# Patient Record
Sex: FEMALE | Race: WHITE | URBAN - METROPOLITAN AREA
[De-identification: names, ages, dates, MRNs, and addresses within clinical notes are randomized per-mention and may not be internally consistent; named-entity substitution may affect disease eponyms.]

---

## 2017-03-03 ENCOUNTER — OP HISTORICAL/CONVERTED ENCOUNTER (OUTPATIENT)
Dept: OTHER | Age: 22
End: 2017-03-03

## 2017-04-01 ENCOUNTER — ED HISTORICAL/CONVERTED ENCOUNTER (OUTPATIENT)
Dept: OTHER | Age: 22
End: 2017-04-01

## 2017-04-07 ENCOUNTER — OP HISTORICAL/CONVERTED ENCOUNTER (OUTPATIENT)
Dept: OTHER | Age: 22
End: 2017-04-07

## 2017-04-26 ENCOUNTER — OP HISTORICAL/CONVERTED ENCOUNTER (OUTPATIENT)
Dept: OTHER | Age: 22
End: 2017-04-26

## 2017-05-02 ENCOUNTER — OP HISTORICAL/CONVERTED ENCOUNTER (OUTPATIENT)
Dept: OTHER | Age: 22
End: 2017-05-02

## 2017-06-12 ENCOUNTER — OP HISTORICAL/CONVERTED ENCOUNTER (OUTPATIENT)
Dept: OTHER | Age: 22
End: 2017-06-12

## 2017-06-29 ENCOUNTER — EMERGENCY (EMERGENCY)
Facility: HOSPITAL | Age: 22
LOS: 1 days | Discharge: HOME | End: 2017-06-29
Attending: EMERGENCY MEDICINE | Admitting: EMERGENCY MEDICINE
Payer: COMMERCIAL

## 2017-06-29 VITALS
TEMPERATURE: 99 F | OXYGEN SATURATION: 100 % | RESPIRATION RATE: 16 BRPM | DIASTOLIC BLOOD PRESSURE: 67 MMHG | HEART RATE: 86 BPM | SYSTOLIC BLOOD PRESSURE: 104 MMHG

## 2017-06-29 DIAGNOSIS — F41.9 ANXIETY DISORDER, UNSPECIFIED: ICD-10-CM

## 2017-06-29 DIAGNOSIS — O99.89 OTHER SPECIFIED DISEASES AND CONDITIONS COMPLICATING PREGNANCY, CHILDBIRTH AND THE PUERPERIUM: ICD-10-CM

## 2017-06-29 DIAGNOSIS — Z79.899 OTHER LONG TERM (CURRENT) DRUG THERAPY: ICD-10-CM

## 2017-06-29 DIAGNOSIS — R20.2 PARESTHESIA OF SKIN: ICD-10-CM

## 2017-06-29 DIAGNOSIS — Z3A.00 WEEKS OF GESTATION OF PREGNANCY NOT SPECIFIED: ICD-10-CM

## 2017-06-29 DIAGNOSIS — O99.340 OTHER MENTAL DISORDERS COMPLICATING PREGNANCY, UNSPECIFIED TRIMESTER: ICD-10-CM

## 2017-06-29 LAB
ALBUMIN SERPL ELPH-MCNC: 4.8 G/DL — SIGNIFICANT CHANGE UP (ref 3.3–5)
ALP SERPL-CCNC: 55 U/L — SIGNIFICANT CHANGE UP (ref 40–120)
ALT FLD-CCNC: 9 U/L — LOW (ref 10–45)
ANION GAP SERPL CALC-SCNC: 13 MMOL/L — SIGNIFICANT CHANGE UP (ref 5–17)
APTT BLD: 33 SEC — SIGNIFICANT CHANGE UP (ref 27.5–37.4)
AST SERPL-CCNC: 14 U/L — SIGNIFICANT CHANGE UP (ref 10–40)
BASOPHILS NFR BLD AUTO: 0.6 % — SIGNIFICANT CHANGE UP (ref 0–2)
BILIRUB SERPL-MCNC: 0.2 MG/DL — SIGNIFICANT CHANGE UP (ref 0.2–1.2)
BUN SERPL-MCNC: 10 MG/DL — SIGNIFICANT CHANGE UP (ref 7–23)
CALCIUM SERPL-MCNC: 9.6 MG/DL — SIGNIFICANT CHANGE UP (ref 8.4–10.5)
CHLORIDE SERPL-SCNC: 99 MMOL/L — SIGNIFICANT CHANGE UP (ref 96–108)
CO2 SERPL-SCNC: 26 MMOL/L — SIGNIFICANT CHANGE UP (ref 22–31)
CREAT SERPL-MCNC: 0.6 MG/DL — SIGNIFICANT CHANGE UP (ref 0.5–1.3)
D DIMER BLD IA.RAPID-MCNC: <150 NG/ML DDU — SIGNIFICANT CHANGE UP
EOSINOPHIL NFR BLD AUTO: 0.6 % — SIGNIFICANT CHANGE UP (ref 0–6)
GLUCOSE SERPL-MCNC: 93 MG/DL — SIGNIFICANT CHANGE UP (ref 70–99)
HCG SERPL-ACNC: 320.4 MIU/ML — HIGH
HCT VFR BLD CALC: 39 % — SIGNIFICANT CHANGE UP (ref 34.5–45)
HGB BLD-MCNC: 13.1 G/DL — SIGNIFICANT CHANGE UP (ref 11.5–15.5)
INR BLD: 1.14 — SIGNIFICANT CHANGE UP (ref 0.88–1.16)
LYMPHOCYTES # BLD AUTO: 19.6 % — SIGNIFICANT CHANGE UP (ref 13–44)
MCHC RBC-ENTMCNC: 31 PG — SIGNIFICANT CHANGE UP (ref 27–34)
MCHC RBC-ENTMCNC: 33.6 G/DL — SIGNIFICANT CHANGE UP (ref 32–36)
MCV RBC AUTO: 92.2 FL — SIGNIFICANT CHANGE UP (ref 80–100)
MONOCYTES NFR BLD AUTO: 6.1 % — SIGNIFICANT CHANGE UP (ref 2–14)
NEUTROPHILS NFR BLD AUTO: 73.1 % — SIGNIFICANT CHANGE UP (ref 43–77)
PLATELET # BLD AUTO: 194 K/UL — SIGNIFICANT CHANGE UP (ref 150–400)
POTASSIUM SERPL-MCNC: 3.6 MMOL/L — SIGNIFICANT CHANGE UP (ref 3.5–5.3)
POTASSIUM SERPL-SCNC: 3.6 MMOL/L — SIGNIFICANT CHANGE UP (ref 3.5–5.3)
PROT SERPL-MCNC: 7.9 G/DL — SIGNIFICANT CHANGE UP (ref 6–8.3)
PROTHROM AB SERPL-ACNC: 12.7 SEC — SIGNIFICANT CHANGE UP (ref 9.8–12.7)
RBC # BLD: 4.23 M/UL — SIGNIFICANT CHANGE UP (ref 3.8–5.2)
RBC # FLD: 13.1 % — SIGNIFICANT CHANGE UP (ref 10.3–16.9)
SODIUM SERPL-SCNC: 138 MMOL/L — SIGNIFICANT CHANGE UP (ref 135–145)
WBC # BLD: 6.6 K/UL — SIGNIFICANT CHANGE UP (ref 3.8–10.5)
WBC # FLD AUTO: 6.6 K/UL — SIGNIFICANT CHANGE UP (ref 3.8–10.5)

## 2017-06-29 PROCEDURE — 80053 COMPREHEN METABOLIC PANEL: CPT

## 2017-06-29 PROCEDURE — 85379 FIBRIN DEGRADATION QUANT: CPT

## 2017-06-29 PROCEDURE — 85025 COMPLETE CBC W/AUTO DIFF WBC: CPT

## 2017-06-29 PROCEDURE — 71046 X-RAY EXAM CHEST 2 VIEWS: CPT

## 2017-06-29 PROCEDURE — 85730 THROMBOPLASTIN TIME PARTIAL: CPT

## 2017-06-29 PROCEDURE — 71020: CPT | Mod: 26

## 2017-06-29 PROCEDURE — 85610 PROTHROMBIN TIME: CPT

## 2017-06-29 PROCEDURE — 93010 ELECTROCARDIOGRAM REPORT: CPT | Mod: NC

## 2017-06-29 PROCEDURE — 84702 CHORIONIC GONADOTROPIN TEST: CPT

## 2017-06-29 PROCEDURE — 99284 EMERGENCY DEPT VISIT MOD MDM: CPT | Mod: 25

## 2017-06-29 PROCEDURE — 36415 COLL VENOUS BLD VENIPUNCTURE: CPT

## 2017-06-29 PROCEDURE — 99285 EMERGENCY DEPT VISIT HI MDM: CPT | Mod: 25

## 2017-06-29 PROCEDURE — 93005 ELECTROCARDIOGRAM TRACING: CPT

## 2017-06-29 RX ORDER — ACETAMINOPHEN 500 MG
650 TABLET ORAL ONCE
Refills: 0 | Status: COMPLETED | OUTPATIENT
Start: 2017-06-29 | End: 2017-06-29

## 2017-06-29 RX ADMIN — Medication 650 MILLIGRAM(S): at 13:38

## 2017-06-29 RX ADMIN — Medication 650 MILLIGRAM(S): at 14:30

## 2017-06-29 NOTE — ED PROVIDER NOTE - MEDICAL DECISION MAKING DETAILS
21 yo female with h/o anxiety, bipolar d/o, in the ER to check for pregnancy and also c/o chest pain that started when she found out that her UCG is positive. Pt appears in no distress, VSS. CXR- normal and negative d-dimer, BHCG-320. Pt has no pelvic pain, no vag. discharge or bleeding. pelvic exam- unremarkable, results discussed with pt. f/u with GYN recommended for further prenatal care. Pt seen by , as she mentioned that right now she is homeless.

## 2017-06-29 NOTE — ED ADULT TRIAGE NOTE - CHIEF COMPLAINT QUOTE
patient reports chest pain since last night, reports pain is worse with inspiration. Patient reports "I can't even take a deep breath." Patient also requesting pregnancy test, reports "I took 4 pregnancy tests at home and they were positive but I want to take another one." Reports LMP 06/02/17

## 2017-06-29 NOTE — ED PROVIDER NOTE - OBJECTIVE STATEMENT
23 yo female in the Er with left side chest pain, tightness since this morning. Pt reports that pain worse when she takes a deep breath. Pt also mentioned that she had changes in appetite and cravings recently, felt that her breast enlarged and she did home UCG last night and it was+. LMP  06/02/17, has h/o PID, irregular bleeding, and Chlamydia+ recently.

## 2017-07-01 ENCOUNTER — OP HISTORICAL/CONVERTED ENCOUNTER (OUTPATIENT)
Dept: OTHER | Age: 22
End: 2017-07-01

## 2017-07-27 ENCOUNTER — EMERGENCY (EMERGENCY)
Facility: HOSPITAL | Age: 22
LOS: 1 days | Discharge: HOME | End: 2017-07-27
Attending: EMERGENCY MEDICINE | Admitting: EMERGENCY MEDICINE
Payer: COMMERCIAL

## 2017-07-27 VITALS
DIASTOLIC BLOOD PRESSURE: 72 MMHG | TEMPERATURE: 98 F | SYSTOLIC BLOOD PRESSURE: 108 MMHG | RESPIRATION RATE: 18 BRPM | HEART RATE: 78 BPM | OXYGEN SATURATION: 100 %

## 2017-07-27 VITALS
RESPIRATION RATE: 18 BRPM | DIASTOLIC BLOOD PRESSURE: 76 MMHG | SYSTOLIC BLOOD PRESSURE: 114 MMHG | HEIGHT: 61 IN | OXYGEN SATURATION: 100 % | TEMPERATURE: 98 F | HEART RATE: 100 BPM | WEIGHT: 90.39 LBS

## 2017-07-27 DIAGNOSIS — O21.0 MILD HYPEREMESIS GRAVIDARUM: ICD-10-CM

## 2017-07-27 DIAGNOSIS — R11.0 NAUSEA: ICD-10-CM

## 2017-07-27 DIAGNOSIS — R19.7 DIARRHEA, UNSPECIFIED: ICD-10-CM

## 2017-07-27 DIAGNOSIS — Z79.899 OTHER LONG TERM (CURRENT) DRUG THERAPY: ICD-10-CM

## 2017-07-27 DIAGNOSIS — Z3A.08 8 WEEKS GESTATION OF PREGNANCY: ICD-10-CM

## 2017-07-27 DIAGNOSIS — G43.909 MIGRAINE, UNSPECIFIED, NOT INTRACTABLE, WITHOUT STATUS MIGRAINOSUS: ICD-10-CM

## 2017-07-27 LAB
ALBUMIN SERPL ELPH-MCNC: 4.4 G/DL — SIGNIFICANT CHANGE UP (ref 3.3–5)
ALP SERPL-CCNC: 47 U/L — SIGNIFICANT CHANGE UP (ref 40–120)
ALT FLD-CCNC: 12 U/L — SIGNIFICANT CHANGE UP (ref 10–45)
ANION GAP SERPL CALC-SCNC: 14 MMOL/L — SIGNIFICANT CHANGE UP (ref 5–17)
APPEARANCE UR: CLEAR — SIGNIFICANT CHANGE UP
AST SERPL-CCNC: 18 U/L — SIGNIFICANT CHANGE UP (ref 10–40)
B-OH-BUTYR SERPL-SCNC: 0.1 MMOL/L — SIGNIFICANT CHANGE UP
BASOPHILS NFR BLD AUTO: 0.4 % — SIGNIFICANT CHANGE UP (ref 0–2)
BILIRUB SERPL-MCNC: 0.3 MG/DL — SIGNIFICANT CHANGE UP (ref 0.2–1.2)
BILIRUB UR-MCNC: NEGATIVE — SIGNIFICANT CHANGE UP
BUN SERPL-MCNC: 11 MG/DL — SIGNIFICANT CHANGE UP (ref 7–23)
CALCIUM SERPL-MCNC: 9.8 MG/DL — SIGNIFICANT CHANGE UP (ref 8.4–10.5)
CHLORIDE SERPL-SCNC: 97 MMOL/L — SIGNIFICANT CHANGE UP (ref 96–108)
CO2 SERPL-SCNC: 21 MMOL/L — LOW (ref 22–31)
COLOR SPEC: YELLOW — SIGNIFICANT CHANGE UP
CREAT SERPL-MCNC: 0.5 MG/DL — SIGNIFICANT CHANGE UP (ref 0.5–1.3)
DIFF PNL FLD: NEGATIVE — SIGNIFICANT CHANGE UP
EOSINOPHIL NFR BLD AUTO: 0.3 % — SIGNIFICANT CHANGE UP (ref 0–6)
GLUCOSE SERPL-MCNC: 143 MG/DL — HIGH (ref 70–99)
GLUCOSE UR QL: NEGATIVE — SIGNIFICANT CHANGE UP
HCG SERPL-ACNC: HIGH MIU/ML
HCT VFR BLD CALC: 33 % — LOW (ref 34.5–45)
HGB BLD-MCNC: 11.3 G/DL — LOW (ref 11.5–15.5)
KETONES UR-MCNC: NEGATIVE — SIGNIFICANT CHANGE UP
LEUKOCYTE ESTERASE UR-ACNC: NEGATIVE — SIGNIFICANT CHANGE UP
LIDOCAIN IGE QN: 19 U/L — SIGNIFICANT CHANGE UP (ref 7–60)
LYMPHOCYTES # BLD AUTO: 13.5 % — SIGNIFICANT CHANGE UP (ref 13–44)
MCHC RBC-ENTMCNC: 31 PG — SIGNIFICANT CHANGE UP (ref 27–34)
MCHC RBC-ENTMCNC: 34.2 G/DL — SIGNIFICANT CHANGE UP (ref 32–36)
MCV RBC AUTO: 90.7 FL — SIGNIFICANT CHANGE UP (ref 80–100)
MONOCYTES NFR BLD AUTO: 5.6 % — SIGNIFICANT CHANGE UP (ref 2–14)
NEUTROPHILS NFR BLD AUTO: 80.2 % — HIGH (ref 43–77)
NITRITE UR-MCNC: NEGATIVE — SIGNIFICANT CHANGE UP
PH UR: 5 — SIGNIFICANT CHANGE UP (ref 5–8)
PLATELET # BLD AUTO: 226 K/UL — SIGNIFICANT CHANGE UP (ref 150–400)
POTASSIUM SERPL-MCNC: 3.4 MMOL/L — LOW (ref 3.5–5.3)
POTASSIUM SERPL-SCNC: 3.4 MMOL/L — LOW (ref 3.5–5.3)
PROT SERPL-MCNC: 7.4 G/DL — SIGNIFICANT CHANGE UP (ref 6–8.3)
PROT UR-MCNC: (no result) MG/DL
RBC # BLD: 3.64 M/UL — LOW (ref 3.8–5.2)
RBC # FLD: 13.7 % — SIGNIFICANT CHANGE UP (ref 10.3–16.9)
SODIUM SERPL-SCNC: 132 MMOL/L — LOW (ref 135–145)
SP GR SPEC: >=1.03 — SIGNIFICANT CHANGE UP (ref 1–1.03)
UROBILINOGEN FLD QL: 0.2 E.U./DL — SIGNIFICANT CHANGE UP
WBC # BLD: 8.9 K/UL — SIGNIFICANT CHANGE UP (ref 3.8–10.5)
WBC # FLD AUTO: 8.9 K/UL — SIGNIFICANT CHANGE UP (ref 3.8–10.5)

## 2017-07-27 PROCEDURE — 36415 COLL VENOUS BLD VENIPUNCTURE: CPT

## 2017-07-27 PROCEDURE — 81001 URINALYSIS AUTO W/SCOPE: CPT

## 2017-07-27 PROCEDURE — 76801 OB US < 14 WKS SINGLE FETUS: CPT

## 2017-07-27 PROCEDURE — 96374 THER/PROPH/DIAG INJ IV PUSH: CPT

## 2017-07-27 PROCEDURE — 76801 OB US < 14 WKS SINGLE FETUS: CPT | Mod: 26

## 2017-07-27 PROCEDURE — 76817 TRANSVAGINAL US OBSTETRIC: CPT | Mod: 26

## 2017-07-27 PROCEDURE — 99284 EMERGENCY DEPT VISIT MOD MDM: CPT | Mod: 25

## 2017-07-27 PROCEDURE — 96375 TX/PRO/DX INJ NEW DRUG ADDON: CPT

## 2017-07-27 PROCEDURE — 76817 TRANSVAGINAL US OBSTETRIC: CPT

## 2017-07-27 PROCEDURE — 87086 URINE CULTURE/COLONY COUNT: CPT

## 2017-07-27 PROCEDURE — 83690 ASSAY OF LIPASE: CPT

## 2017-07-27 PROCEDURE — 84702 CHORIONIC GONADOTROPIN TEST: CPT

## 2017-07-27 PROCEDURE — 82010 KETONE BODYS QUAN: CPT

## 2017-07-27 PROCEDURE — 85025 COMPLETE CBC W/AUTO DIFF WBC: CPT

## 2017-07-27 PROCEDURE — 99285 EMERGENCY DEPT VISIT HI MDM: CPT

## 2017-07-27 PROCEDURE — 80053 COMPREHEN METABOLIC PANEL: CPT

## 2017-07-27 RX ORDER — SODIUM CHLORIDE 9 MG/ML
1000 INJECTION INTRAMUSCULAR; INTRAVENOUS; SUBCUTANEOUS ONCE
Refills: 0 | Status: COMPLETED | OUTPATIENT
Start: 2017-07-27 | End: 2017-07-27

## 2017-07-27 RX ORDER — AMOXICILLIN 250 MG/5ML
1 SUSPENSION, RECONSTITUTED, ORAL (ML) ORAL
Qty: 30 | Refills: 0
Start: 2017-07-27 | End: 2017-08-06

## 2017-07-27 RX ORDER — FAMOTIDINE 10 MG/ML
20 INJECTION INTRAVENOUS ONCE
Refills: 0 | Status: COMPLETED | OUTPATIENT
Start: 2017-07-27 | End: 2017-07-27

## 2017-07-27 RX ORDER — METOCLOPRAMIDE HCL 10 MG
10 TABLET ORAL ONCE
Refills: 0 | Status: COMPLETED | OUTPATIENT
Start: 2017-07-27 | End: 2017-07-27

## 2017-07-27 RX ORDER — METOCLOPRAMIDE HCL 10 MG
1 TABLET ORAL
Qty: 90 | Refills: 0
Start: 2017-07-27 | End: 2017-08-26

## 2017-07-27 RX ADMIN — FAMOTIDINE 20 MILLIGRAM(S): 10 INJECTION INTRAVENOUS at 12:19

## 2017-07-27 RX ADMIN — Medication 104 MILLIGRAM(S): at 12:19

## 2017-07-27 RX ADMIN — SODIUM CHLORIDE 1500 MILLILITER(S): 9 INJECTION INTRAMUSCULAR; INTRAVENOUS; SUBCUTANEOUS at 11:53

## 2017-07-27 RX ADMIN — SODIUM CHLORIDE 1500 MILLILITER(S): 9 INJECTION INTRAMUSCULAR; INTRAVENOUS; SUBCUTANEOUS at 13:11

## 2017-07-27 NOTE — CONSULT NOTE ADULT - ASSESSMENT
21 yo w/ nausea vomiting of pregnancy at 7w6d by LMP  -Recommend diclegis prn for nausea vomiting  -Bacteria in urine w/ suprapubic tenderness suggestive of UTI. DC w/ amoxicillin 500 TID x10d  -Recommend eating frequent small meals  -f/u with Dr. Jarquin next week    d/w Dr. Sheehan.

## 2017-07-27 NOTE — ED ADULT TRIAGE NOTE - CHIEF COMPLAINT QUOTE
Patient 8 weeks pregnant , c/o several times vomiting , migraine headache , diarrhea and dizziness for 3 days . Denies any vaginal bleeding nor abdominal pain .

## 2017-07-27 NOTE — ED ADULT NURSE NOTE - CHPI ED SYMPTOMS NEG
CP, SOB, dizziness/no abdominal distension/no blood in stool/no dysuria/no hematuria/no burning urination/no chills

## 2017-07-27 NOTE — CONSULT NOTE ADULT - SUBJECTIVE AND OBJECTIVE BOX
21 yo  at 7w6d by LMP presents for nausea and vomiting for a few weeks, worse over past few days. Has not been able to tolerate PO. Vomiting is nonbilious nonbloody. Denies fever/chills, diarrhea. Does have suprapubic pain and pressure, no dysuria. Has not taken any medication for nausea/vomiting. Was on macrobid recently for UTI but did not complete course 2/2 to nausea. Was on amoxicillin for strep throat last week but did not complete course.     Here in ED, patient given 1 L IV hydration and antiemetic, has been able to tolerate PO, nausea is improved.     ObHx: VTOP D&E   GynHx: Genital herpes, PID 2-3x, gonnorrhea 2x, chlamydia, all treated outpatient. Hx of abnormal Paps w/ normal colpos. Hx of ovarian cyst, no surgery  PMH: denies  PSH: D&E  Meds: none  NKDA    VS   Afebrile  /60  HR 66  Gen: resting comfortably, no pallor, normal skin turgor  Chest: normal work of breathing  Abd: soft, mild tenderness to deep palpation suprapubic, no distension, no rebound or guarding                          11.3   8.9   )-----------( 226      ( 2017 11:39 )             33.0       132<L>  |  97  |  11  ----------------------------<  143<H>  3.4<L>   |  21<L>  |  0.50    Ca    9.8      2017 11:39    TPro  7.4  /  Alb  4.4  /  TBili  0.3  /  DBili  x   /  AST  18  /  ALT  12  /  AlkPhos  47      < from: US Echo Transvaginal, OB (17 @ 15:10) >  INTERPRETATION:  OBSTETRICAL ULTRASOUND - FIRST TRIMESTER dated 2017   3:10 PM    INDICATION: 22-year-old female with pelvic pain. LMP: 2017    TECHNIQUE: Transabdominal views of the pelvis were obtained followed by   transvaginal views for better visualization of the endometrial cavity.      Urinalysis Basic - ( 2017 12:35 )    Color: Yellow / Appearance: Clear / SG: >=1.030 / pH: x  Gluc: x / Ketone: NEGATIVE  / Bili: NEGATIVE / Urobili: 0.2 E.U./dL   Blood: x / Protein: Trace mg/dL / Nitrite: NEGATIVE   Leuk Esterase: NEGATIVE / RBC: < 5 /HPF / WBC < 5 /HPF   Sq Epi: x / Non Sq Epi: Few /HPF / Bacteria: Present /HPF        PRIOR STUDIES: 2014    FINDINGS:   A single intrauterine gestation is visible with an average ultrasound age   of 8 weeks 1 day.   Mean sac diameter is 2.9 cm, corresponding to a gestational age of 8   weeks 1 day.  Crown-rump length is 16.19 mm, corresponding to gestational age of 8   weeks 1 day.    Fetal cardiac motion is visible with fetal heart rate of 174 beats per   minute.  A yolk sac is visible with internal diameter 0.3 cm, which is normal.    A normal amount of amniotic fluid is evident. The placenta is located      . No placenta previa is evident.  There is a crescentic hypoechoic area   about the gestational sac consistent with a small subchorionic bleed.    The cervix is closed with a length of 3.6 cm.    The uterus is anteverted. The uterus is 11.7 x 5.3 x 8.3 cm. in size  No   discrete myometrial masses are seen.     The right ovary measures 3.2 x 2.9 x 2.2 cm in size, and contains a 1.9 x   1.4 x 1 cm lobulated cystic lesions with internal debris versus a mural   solid component.  The left ovary measures 3.6 x 1.9 x 1.9 cm, and is sonographically   unremarkable appearance.  Doppler evaluation demonstrates normal color   Doppler flow and spectral arterial/venous waveforms to both ovaries with   no evidence of torsion.    Small amount of free pelvicfluid is noted, probably physiologic.      IMPRESSION:   Single viable intrauterine pregnancy with an estimated gestational age of   8 weeks 1 day (based on crown-rump length).    A small subchorionic bleed is noted.     A 1.9 cm complex cystic rightovarian lesion, which either contains   internal debris or a solid mural component. At time of patient's second   trimester anatomic fetal survey, repeat imaging of the right ovary is   recommended for follow-up.    < end of copied text >

## 2017-07-27 NOTE — ED PROVIDER NOTE - OBJECTIVE STATEMENT
23 yo F w/ h/o abdominal hernia,  (gestation 8 weeks, IUP confirmed ), p/w n/v/d and pelvic pain. Pt has experienced worsening of her sx's the last 2 days, experiencing vomiting 5 times and diarrhea 3 times yesterday. Pt also c/o a current migraine and photophobia. Pt was treated for a UTI 4-5 weeks ago. Pt reports inability to tolerate PO, was not fully compliant w/ medication. Pt's last period was . Pt's OBGYN is Dr. Jarquin. Pt denies any nasal congestion, runny nose, dysuria, or hematuria. 23 yo F w/ h/o abdominal hernia,  (gestation 8 weeks, IUP confirmed ), p/w n/v/ and pelvic pain. Pt has experienced worsening of her sx's the last 2 days, experiencing vomiting 5 times. Pt also c/o a current migraine. Pt was treated for a UTI 4-5 weeks ago. Pt reports inability to tolerate PO, was not fully compliant w/ medication. Pt's last period was . Pt's OBGYN is Dr. Jarquin. Pt denies any fever, nasal congestion, runny nose, dysuria, hematuria, vaginal bleeding.

## 2017-07-27 NOTE — ED PROVIDER NOTE - ATTENDING CONTRIBUTION TO CARE
23 yo F w/ h/o ventral hernia,  8 weeks w known IUP c/o n/v/d and pelvic pain, worse x 2 d, unable to tolerate po's and could not fill b6 rx.  Pt also c/o migraine and photophobia w/o neck stiffness, fever, uri sx,  Pt s/p uti - took some but not all of the abx 2/2 n/v.  Denies dysuria.  No abnl discharge or bleeding.  Thin, appears fatigued, nc/at, mm dry, lung cta, heart reg, abd soft, + suprapubic ttp, ext no c/c/e, no gross neuro deficits.  Suspect hyperemesis; plan ivf, antiemetics, labs, u/s; will discuss w gyn.  Poss dc w antiemetic rx.

## 2017-07-28 LAB
CULTURE RESULTS: NO GROWTH — SIGNIFICANT CHANGE UP
SPECIMEN SOURCE: SIGNIFICANT CHANGE UP

## 2017-09-07 ENCOUNTER — EMERGENCY (EMERGENCY)
Facility: HOSPITAL | Age: 22
LOS: 1 days | Discharge: LEFT BEFORE DISCHARGE | End: 2017-09-07
Attending: EMERGENCY MEDICINE | Admitting: EMERGENCY MEDICINE
Payer: COMMERCIAL

## 2017-09-07 VITALS
OXYGEN SATURATION: 100 % | HEART RATE: 104 BPM | TEMPERATURE: 99 F | DIASTOLIC BLOOD PRESSURE: 60 MMHG | RESPIRATION RATE: 18 BRPM | SYSTOLIC BLOOD PRESSURE: 91 MMHG

## 2017-09-07 VITALS
TEMPERATURE: 99 F | OXYGEN SATURATION: 100 % | DIASTOLIC BLOOD PRESSURE: 65 MMHG | HEART RATE: 81 BPM | SYSTOLIC BLOOD PRESSURE: 106 MMHG | RESPIRATION RATE: 16 BRPM

## 2017-09-07 DIAGNOSIS — R63.0 ANOREXIA: ICD-10-CM

## 2017-09-07 DIAGNOSIS — Z3A.14 14 WEEKS GESTATION OF PREGNANCY: ICD-10-CM

## 2017-09-07 DIAGNOSIS — O26.892 OTHER SPECIFIED PREGNANCY RELATED CONDITIONS, SECOND TRIMESTER: ICD-10-CM

## 2017-09-07 DIAGNOSIS — R00.0 TACHYCARDIA, UNSPECIFIED: ICD-10-CM

## 2017-09-07 DIAGNOSIS — E86.0 DEHYDRATION: ICD-10-CM

## 2017-09-07 DIAGNOSIS — R10.9 UNSPECIFIED ABDOMINAL PAIN: ICD-10-CM

## 2017-09-07 LAB
ALBUMIN SERPL ELPH-MCNC: 3.7 G/DL — SIGNIFICANT CHANGE UP (ref 3.3–5)
ALP SERPL-CCNC: 56 U/L — SIGNIFICANT CHANGE UP (ref 40–120)
ALT FLD-CCNC: 7 U/L — LOW (ref 10–45)
ANION GAP SERPL CALC-SCNC: 13 MMOL/L — SIGNIFICANT CHANGE UP (ref 5–17)
APPEARANCE UR: CLEAR — SIGNIFICANT CHANGE UP
AST SERPL-CCNC: 14 U/L — SIGNIFICANT CHANGE UP (ref 10–40)
BASOPHILS NFR BLD AUTO: 0.5 % — SIGNIFICANT CHANGE UP (ref 0–2)
BILIRUB SERPL-MCNC: <0.2 MG/DL — SIGNIFICANT CHANGE UP (ref 0.2–1.2)
BILIRUB UR-MCNC: NEGATIVE — SIGNIFICANT CHANGE UP
BUN SERPL-MCNC: 11 MG/DL — SIGNIFICANT CHANGE UP (ref 7–23)
CALCIUM SERPL-MCNC: 8.9 MG/DL — SIGNIFICANT CHANGE UP (ref 8.4–10.5)
CHLORIDE SERPL-SCNC: 97 MMOL/L — SIGNIFICANT CHANGE UP (ref 96–108)
CO2 SERPL-SCNC: 23 MMOL/L — SIGNIFICANT CHANGE UP (ref 22–31)
COLOR SPEC: YELLOW — SIGNIFICANT CHANGE UP
CREAT SERPL-MCNC: 0.5 MG/DL — SIGNIFICANT CHANGE UP (ref 0.5–1.3)
DIFF PNL FLD: NEGATIVE — SIGNIFICANT CHANGE UP
EOSINOPHIL NFR BLD AUTO: 0.9 % — SIGNIFICANT CHANGE UP (ref 0–6)
GLUCOSE SERPL-MCNC: 120 MG/DL — HIGH (ref 70–99)
GLUCOSE UR QL: NEGATIVE — SIGNIFICANT CHANGE UP
HCT VFR BLD CALC: 32.2 % — LOW (ref 34.5–45)
HGB BLD-MCNC: 11.1 G/DL — LOW (ref 11.5–15.5)
KETONES UR-MCNC: NEGATIVE — SIGNIFICANT CHANGE UP
LEUKOCYTE ESTERASE UR-ACNC: NEGATIVE — SIGNIFICANT CHANGE UP
LYMPHOCYTES # BLD AUTO: 16.4 % — SIGNIFICANT CHANGE UP (ref 13–44)
MCHC RBC-ENTMCNC: 31.4 PG — SIGNIFICANT CHANGE UP (ref 27–34)
MCHC RBC-ENTMCNC: 34.5 G/DL — SIGNIFICANT CHANGE UP (ref 32–36)
MCV RBC AUTO: 91.2 FL — SIGNIFICANT CHANGE UP (ref 80–100)
MONOCYTES NFR BLD AUTO: 6.8 % — SIGNIFICANT CHANGE UP (ref 2–14)
NEUTROPHILS NFR BLD AUTO: 75.4 % — SIGNIFICANT CHANGE UP (ref 43–77)
NITRITE UR-MCNC: NEGATIVE — SIGNIFICANT CHANGE UP
PH UR: 6 — SIGNIFICANT CHANGE UP (ref 5–8)
PLATELET # BLD AUTO: 225 K/UL — SIGNIFICANT CHANGE UP (ref 150–400)
POTASSIUM SERPL-MCNC: 3.2 MMOL/L — LOW (ref 3.5–5.3)
POTASSIUM SERPL-SCNC: 3.2 MMOL/L — LOW (ref 3.5–5.3)
PROT SERPL-MCNC: 7 G/DL — SIGNIFICANT CHANGE UP (ref 6–8.3)
PROT UR-MCNC: NEGATIVE MG/DL — SIGNIFICANT CHANGE UP
RBC # BLD: 3.53 M/UL — LOW (ref 3.8–5.2)
RBC # FLD: 13.5 % — SIGNIFICANT CHANGE UP (ref 10.3–16.9)
SODIUM SERPL-SCNC: 133 MMOL/L — LOW (ref 135–145)
SP GR SPEC: 1.02 — SIGNIFICANT CHANGE UP (ref 1–1.03)
UROBILINOGEN FLD QL: 0.2 E.U./DL — SIGNIFICANT CHANGE UP
WBC # BLD: 6.4 K/UL — SIGNIFICANT CHANGE UP (ref 3.8–10.5)
WBC # FLD AUTO: 6.4 K/UL — SIGNIFICANT CHANGE UP (ref 3.8–10.5)

## 2017-09-07 PROCEDURE — 76817 TRANSVAGINAL US OBSTETRIC: CPT | Mod: 26

## 2017-09-07 PROCEDURE — 99285 EMERGENCY DEPT VISIT HI MDM: CPT | Mod: 25

## 2017-09-07 PROCEDURE — 76805 OB US >/= 14 WKS SNGL FETUS: CPT | Mod: 26

## 2017-09-07 PROCEDURE — 93010 ELECTROCARDIOGRAM REPORT: CPT

## 2017-09-07 RX ORDER — SODIUM CHLORIDE 9 MG/ML
1000 INJECTION INTRAMUSCULAR; INTRAVENOUS; SUBCUTANEOUS ONCE
Refills: 0 | Status: COMPLETED | OUTPATIENT
Start: 2017-09-07 | End: 2017-09-07

## 2017-09-07 RX ADMIN — SODIUM CHLORIDE 2000 MILLILITER(S): 9 INJECTION INTRAMUSCULAR; INTRAVENOUS; SUBCUTANEOUS at 12:10

## 2017-09-07 RX ADMIN — SODIUM CHLORIDE 2000 MILLILITER(S): 9 INJECTION INTRAMUSCULAR; INTRAVENOUS; SUBCUTANEOUS at 14:55

## 2017-09-07 NOTE — ED PROVIDER NOTE - OBJECTIVE STATEMENT
21 yo female , LMP 06/02/, CARLEEN 03/07/2018, in the Er c/o yellowish malodorous vaginal discharge, cramping abdominal pain, generalized weakness and decreased appetite, decreased PO intake. Pt reports that with this pregnancy she has a lot of nausea and cant eat, feels like fainting. Pt denies HA or chest pain, denies any cardiac condition. Last OB/GYN visit was about 2 weeks, but very brief. Pt denies fever or chills, denies vaginal bleeding.

## 2017-09-07 NOTE — ED ADULT NURSE NOTE - OBJECTIVE STATEMENT
Pt presents to ED c/o lightheadedness. Pt is 14 weeks pregnant  presents today reporting lightheadedness for a few days. Pt states "today [place] had [place] on the train because I started seeing white spots and felt like I was going to pass out but I didn't." Pt endorses HA yesterday and this morning, none today. Pt also reporting some pelvic cramping with yellow discharge, no vaginal bleeding. Pt denies fever, chills, N/V, painful urination. Pt presents in NAD speaking full sentences ambulatory through triage. EKG preformed in triage. Pt presents to ED c/o lightheadedness. Pt is 14 weeks pregnant  presents today reporting lightheadedness for a few days. Pt states "today I had to sit down on the train because I started seeing white spots and felt like I was going to pass out but I didn't." Pt endorses HA yesterday and this morning, none today. Pt also reporting some pelvic cramping with yellow discharge, no vaginal bleeding. Pt denies fever, chills, CP, palpitations, SOB, N/V, painful urination. Pt presents in NAD speaking full sentences ambulatory through triage. EKG preformed in triage.

## 2017-09-07 NOTE — ED PROVIDER NOTE - CARE PLAN
Principal Discharge DX:	Abdominal pain in pregnancy, second trimester  Secondary Diagnosis:	Dehydration

## 2017-09-07 NOTE — ED PROVIDER NOTE - PROGRESS NOTE DETAILS
pt refused to wait for GYN on call. results of US discussed. pt wants to leave and states that will f/u with her GYN tomorrow

## 2017-09-07 NOTE — ED PROVIDER NOTE - ATTENDING CONTRIBUTION TO CARE
22F with nausea, decreased Po intake, and abdominal cramping and vaginal DC in pregnancy. Labs and UA performed, show mild hypoK likely due to decerased PO. US shows IUP c/w dates and open internal OS - pt informed of results and she is aware of findings -states she is being followed elsewhere for high risk pregnancy and has similar US finding previously. Pt did not want to wait for gyn, lives in shelter and wants to get back. left AMA and aware of risks.

## 2017-09-07 NOTE — ED ADULT TRIAGE NOTE - CHIEF COMPLAINT QUOTE
pt reports 14 weeks pregnant, since yesterday feeling weak, faint, and nauseas. Not able to eat and drink. Denies syncope. Reports this happened earlier in her pregnancy as well.

## 2017-09-07 NOTE — ED PROVIDER NOTE - MEDICAL DECISION MAKING DETAILS
22F with nausea, decreased Po intake, and abdominal cramping and vaginal DC in pregnancy. Labs and UA performed, show mild hypoK likely due to decreased PO. US shows IUP c/w dates and open internal OS - pt informed of results and she is aware of findings -states she is being followed elsewhere for high risk pregnancy and has similar US finding previously. Pt did not want to wait for gyn, lives in shelter and wants to get back. left AMA and aware of risks.

## 2017-09-08 LAB
C TRACH RRNA SPEC QL NAA+PROBE: SIGNIFICANT CHANGE UP
CULTURE RESULTS: SIGNIFICANT CHANGE UP
N GONORRHOEA RRNA SPEC QL NAA+PROBE: SIGNIFICANT CHANGE UP
SPECIMEN SOURCE: SIGNIFICANT CHANGE UP
SPECIMEN SOURCE: SIGNIFICANT CHANGE UP

## 2018-05-24 ENCOUNTER — RESULT REVIEW (OUTPATIENT)
Age: 23
End: 2018-05-24

## 2018-07-08 ENCOUNTER — EMERGENCY (EMERGENCY)
Facility: HOSPITAL | Age: 23
LOS: 1 days | Discharge: HOME | End: 2018-07-08
Attending: EMERGENCY MEDICINE | Admitting: EMERGENCY MEDICINE
Payer: MEDICAID

## 2018-07-08 VITALS
SYSTOLIC BLOOD PRESSURE: 117 MMHG | RESPIRATION RATE: 16 BRPM | DIASTOLIC BLOOD PRESSURE: 61 MMHG | OXYGEN SATURATION: 100 % | TEMPERATURE: 98 F | WEIGHT: 88.63 LBS | HEART RATE: 79 BPM

## 2018-07-08 VITALS
DIASTOLIC BLOOD PRESSURE: 60 MMHG | HEART RATE: 75 BPM | RESPIRATION RATE: 16 BRPM | SYSTOLIC BLOOD PRESSURE: 118 MMHG | OXYGEN SATURATION: 100 %

## 2018-07-08 LAB
ALBUMIN SERPL ELPH-MCNC: 4.6 G/DL — SIGNIFICANT CHANGE UP (ref 3.3–5)
ALP SERPL-CCNC: 61 U/L — SIGNIFICANT CHANGE UP (ref 40–120)
ALT FLD-CCNC: 13 U/L — SIGNIFICANT CHANGE UP (ref 10–45)
ANION GAP SERPL CALC-SCNC: 14 MMOL/L — SIGNIFICANT CHANGE UP (ref 5–17)
APPEARANCE UR: ABNORMAL
AST SERPL-CCNC: 26 U/L — SIGNIFICANT CHANGE UP (ref 10–40)
BACTERIA # UR AUTO: PRESENT /HPF
BASOPHILS NFR BLD AUTO: 1.1 % — SIGNIFICANT CHANGE UP (ref 0–2)
BILIRUB SERPL-MCNC: 0.4 MG/DL — SIGNIFICANT CHANGE UP (ref 0.2–1.2)
BILIRUB UR-MCNC: ABNORMAL
BUN SERPL-MCNC: 13 MG/DL — SIGNIFICANT CHANGE UP (ref 7–23)
CALCIUM SERPL-MCNC: 9.7 MG/DL — SIGNIFICANT CHANGE UP (ref 8.4–10.5)
CHLORIDE SERPL-SCNC: 100 MMOL/L — SIGNIFICANT CHANGE UP (ref 96–108)
CO2 SERPL-SCNC: 25 MMOL/L — SIGNIFICANT CHANGE UP (ref 22–31)
COLOR SPEC: YELLOW — SIGNIFICANT CHANGE UP
COMMENT - URINE: SIGNIFICANT CHANGE UP
COMMENT - URINE: SIGNIFICANT CHANGE UP
CREAT SERPL-MCNC: 0.73 MG/DL — SIGNIFICANT CHANGE UP (ref 0.5–1.3)
DIFF PNL FLD: ABNORMAL
EOSINOPHIL NFR BLD AUTO: 0.9 % — SIGNIFICANT CHANGE UP (ref 0–6)
EPI CELLS # UR: ABNORMAL /HPF (ref 0–5)
GLUCOSE SERPL-MCNC: 85 MG/DL — SIGNIFICANT CHANGE UP (ref 70–99)
GLUCOSE UR QL: NEGATIVE — SIGNIFICANT CHANGE UP
HCT VFR BLD CALC: 37.8 % — SIGNIFICANT CHANGE UP (ref 34.5–45)
HGB BLD-MCNC: 12.2 G/DL — SIGNIFICANT CHANGE UP (ref 11.5–15.5)
KETONES UR-MCNC: 15 MG/DL
LEUKOCYTE ESTERASE UR-ACNC: NEGATIVE — SIGNIFICANT CHANGE UP
LIDOCAIN IGE QN: 17 U/L — SIGNIFICANT CHANGE UP (ref 7–60)
LYMPHOCYTES # BLD AUTO: 21.1 % — SIGNIFICANT CHANGE UP (ref 13–44)
MCHC RBC-ENTMCNC: 28.9 PG — SIGNIFICANT CHANGE UP (ref 27–34)
MCHC RBC-ENTMCNC: 32.3 G/DL — SIGNIFICANT CHANGE UP (ref 32–36)
MCV RBC AUTO: 89.6 FL — SIGNIFICANT CHANGE UP (ref 80–100)
MONOCYTES NFR BLD AUTO: 12.9 % — SIGNIFICANT CHANGE UP (ref 2–14)
NEUTROPHILS NFR BLD AUTO: 64 % — SIGNIFICANT CHANGE UP (ref 43–77)
NITRITE UR-MCNC: NEGATIVE — SIGNIFICANT CHANGE UP
PH UR: 5.5 — SIGNIFICANT CHANGE UP (ref 5–8)
PLATELET # BLD AUTO: 185 K/UL — SIGNIFICANT CHANGE UP (ref 150–400)
POTASSIUM SERPL-MCNC: 3.6 MMOL/L — SIGNIFICANT CHANGE UP (ref 3.5–5.3)
POTASSIUM SERPL-SCNC: 3.6 MMOL/L — SIGNIFICANT CHANGE UP (ref 3.5–5.3)
PROT SERPL-MCNC: 7.5 G/DL — SIGNIFICANT CHANGE UP (ref 6–8.3)
PROT UR-MCNC: 30 MG/DL
RBC # BLD: 4.22 M/UL — SIGNIFICANT CHANGE UP (ref 3.8–5.2)
RBC # FLD: 16.7 % — SIGNIFICANT CHANGE UP (ref 10.3–16.9)
RBC CASTS # UR COMP ASSIST: < 5 /HPF — SIGNIFICANT CHANGE UP
SODIUM SERPL-SCNC: 139 MMOL/L — SIGNIFICANT CHANGE UP (ref 135–145)
SP GR SPEC: >=1.03 — SIGNIFICANT CHANGE UP (ref 1–1.03)
UROBILINOGEN FLD QL: 0.2 E.U./DL — SIGNIFICANT CHANGE UP
WBC # BLD: 3.5 K/UL — LOW (ref 3.8–10.5)
WBC # FLD AUTO: 3.5 K/UL — LOW (ref 3.8–10.5)
WBC UR QL: < 5 /HPF — SIGNIFICANT CHANGE UP

## 2018-07-08 PROCEDURE — 99284 EMERGENCY DEPT VISIT MOD MDM: CPT

## 2018-07-08 RX ORDER — ONDANSETRON 8 MG/1
4 TABLET, FILM COATED ORAL ONCE
Refills: 0 | Status: COMPLETED | OUTPATIENT
Start: 2018-07-08 | End: 2018-07-08

## 2018-07-08 RX ORDER — SODIUM CHLORIDE 9 MG/ML
1000 INJECTION INTRAMUSCULAR; INTRAVENOUS; SUBCUTANEOUS ONCE
Refills: 0 | Status: COMPLETED | OUTPATIENT
Start: 2018-07-08 | End: 2018-07-08

## 2018-07-08 RX ORDER — ONDANSETRON 8 MG/1
1 TABLET, FILM COATED ORAL
Qty: 9 | Refills: 0
Start: 2018-07-08 | End: 2018-07-10

## 2018-07-08 RX ADMIN — SODIUM CHLORIDE 2000 MILLILITER(S): 9 INJECTION INTRAMUSCULAR; INTRAVENOUS; SUBCUTANEOUS at 12:56

## 2018-07-08 RX ADMIN — ONDANSETRON 4 MILLIGRAM(S): 8 TABLET, FILM COATED ORAL at 12:57

## 2018-07-08 NOTE — ED PROVIDER NOTE - MEDICAL DECISION MAKING DETAILS
n/v/d ? viral illness vs side effect of medication. VSS. abd non tender. no indication for imaging at this time. Labs noted. zofran and fluids given and pt feeling well. pt tolerating po. will d/c home to f/u in clinic

## 2018-07-08 NOTE — ED ADULT NURSE NOTE - OBJECTIVE STATEMENT
Pt w hx of genital herpes and recent bacterial vaginosis, treated w Metronidazole intravaginally and bactrim PO presents to ED c/o upset stomach. nausea and vomiting x two days. Pt denies any chest pain, SOB, abdominal pain, weakness.

## 2018-07-08 NOTE — ED PROVIDER NOTE - OBJECTIVE STATEMENT
24 y/o female with no sig PMHx c/o n/v/d x 2 days. pt states 2 days ago began metrogel for BV and macrobid for UTI. pt notes few hrs after taking macrobid began with n/v. pt notes watery stool as well. no blood or melena. no fever or chills. + intermittent abd pain. no urinary sx's. LMP currently.

## 2018-07-08 NOTE — ED PROVIDER NOTE - ATTENDING CONTRIBUTION TO CARE
Pt is a 22yo f, who p/w n/v/d after taking macrobid for uti. Also on metrogel for bv. Nbnb emesis. No brbpr/ melnena. No f/c. + mild intermittent abd pain. + slight dysuria, no flank pain. LMP yesterday. Afebrile, hds. WNWD f in nad. + s1, s2, rrr. Lungs cta b/l. Abd soft, nt/nd, no guarding/ rebound. No cvat. Wbc minimally low at 3.5; lfts and lipase wnl. Urine preg neg. Pt feeling better w/ zofran and ivf hydration. Abd continuing to remain benign. ? adverse rxn to macrobid vs. gastroenteritis. Pt tolerating po. Will dc home w/ rx for zofran. Pt to f/u with her pcp for re-evaluation.

## 2018-07-12 DIAGNOSIS — R19.7 DIARRHEA, UNSPECIFIED: ICD-10-CM

## 2018-07-12 DIAGNOSIS — R10.9 UNSPECIFIED ABDOMINAL PAIN: ICD-10-CM

## 2018-07-12 DIAGNOSIS — R11.2 NAUSEA WITH VOMITING, UNSPECIFIED: ICD-10-CM

## 2018-07-12 DIAGNOSIS — Z79.899 OTHER LONG TERM (CURRENT) DRUG THERAPY: ICD-10-CM

## 2019-07-05 NOTE — ED PROVIDER NOTE - CARE PLAN
Date of Procedure: 05 July 2019  Pre-operative Diagnosis: Symptomatic Primary Hyperparathyroidism  Post-operative Diagnosis: Symptomatic Primary Hyperparathyroidism  Procedure(s):   Neck exploration   Radioguided parathyroid surgery  Parathyroidectomy  resection of LEFT SUPERIOR parathyroid adenoma  Laryngeal nerve monitoring  Surgeon(s) and Role:   Panel 1:   * Babs Blue MD  Co-surgeon   * Ellen Thompson MD - Co-surgeon   Anesthesia: General + 6 ml of Local (50:50 mix of 1% LIDO + EPI)   Urine output: Not documented   Estimated Blood Loss: 2 ml    IVF: 500 ml   Drains: None   Pre-operative iPTH = 111.0 pg/ml   Patient in room at 1136 hours   Antibiotic prophylaxis ANCEF 2.0 gm given at 1147 hours   Beta blocker not indicated   Pre-prep time out: 1147 hours   Prayer at 1159 hours   Time out for Surgery at 1159 hours   (Correct patient, operative site and procedure confirmed, along with having the necessary equipment on hand to perform the operation safely)   Start of surgery at 1159 hours   End of surgery at 1225 hours   VTE prophylaxis with bilateral thigh high DIONNE hose and bilateral lower extremity compression devices   Pressure points padded   Sponge, sharp and instrument count: Correct   History:  58-year-old female with biochemically confirmed symptomatic primary hyperparathyroidism (Serum calcium 10.5  11.8 mg/dl, and non-suppressed iPTH 64 pg/ml). 25-OH VIT D is within normal limits   No family history of thyroid or parathyroid disease. Diagnostic imaging suggests a left-sided parathyroid adenoma. 04/22/2019  EXAM: NM PARATHYROID SCAN, US THYROID/PARATHYROID/SOFT TISS     INDICATION: Primary hyperparathyroidism ([address] COMPARISON: None. CORRELATIVE IMAGING STUDIES:     TRACER: 20 mCi Tc 99m sestamibi. TECHNIQUE: Imaging of the neck and chest was performed in the anterior  projection following the uneventful intravenous administration of Tc 99m  sestamibi.  Delayed images of the chest and neck were also obtained. FINDINGS:  The initial images demonstrate physiologic tracer uptake in the salivary glands,  thyroid, and myocardium. The delayed images demonstrate small focus of persistent activity overlying  lower pole left thyroid. Study was supplemented with ultrasound of the thyroid. The right lobe measures 4.5 x 1.3 x 1.5 cm. The left lobe measures 4 x 1.4 x  1.3 cm. Isthmus measures 3 mm. There is a 1.2 x 0.6 x 0.9 cm hypoechoic nodule in the upper pole of the right  thyroid. There is a 0.7 x 0.6 cm hypoechoic nodule lower pole left thyroid. Inferior to the lower pole left thyroid posterior to the left common carotid  artery there is a 0.9 x 0.9 x 0.6 cm hypoechoic nodule. IMPRESSION: Findings on nuclear medicine scan and ultrasound are suspicious for a 0.6 x 0.9 x 0.9 cm  parathyroid adenoma inferior to lower pole of the left thyroid and  posterior to the left common carotid artery. 06/03/2019  EXAM:  CT NECK SOFT TISSUE WO CONT   INDICATION:  Benign neoplasm of parathyroid gland  COMPARISON: Thyroid ultrasound 4/22/2019, nuclear medicine study 4/22/2019. CONTRAST: None. TECHNIQUE: Multislice helical CT was performed from the mid calvarium to the  aortic arch without intravenous contrast administration. Contiguous 2.5 mm axial  images were reconstructed and lung and soft tissue windows were generated. Coronal and sagittal reformations were generated. CT dose reduction was  achieved through use of a standardized protocol tailored for this examination  and automatic exposure control for dose modulation. FINDINGS:  Posterior to the inferior left thyroid gland, there is a paraesophageal nodule  measuring 8 x 4 x 8 mm (series 3 image 59 and coronal image 35). The aerodigestive tract is unremarkable. An 11 mm right thyroid nodule is noted. No pathologically enlarged lymph nodes. Visualized brain parenchyma is normal in  appearance.   Paranasal sinuses and mastoid air cells are clear. Intraorbital  contents are unremarkable. No acute fracture or aggressive osseous lesion. Multilevel degenerative disc disease in the cervical spine, most advanced at  C3-C4, C5-C6 and C6-C7. Visualized portions of the lung apices are normal.     IMPRESSION:   An 8 x 4 x 8 mm left paraesophageal nodule posterior to the left thyroid  gland, most consistent with a parathyroid adenoma when correlated with prior  nuclear medicine imaging. Note that the examination was performed without  contrast.     Pre-op holding area: The patient was seen in the pre-operative holding area. Patient was informed of the indications, nature, risks, benefits, alternatives and expected outcomes of the proposed operation:   Neck exploration   Radioguided parathyroid surgery / parathyroidectomy   All other indicated procedures   The patient previously voiced understanding of the aforesaid and provided informed consent for the planned operation   The patient asked pertinent questions, all of which were answered to her apparent satisfaction   The informed consent was reviewed in the holding area and the patient voiced understanding and agreement. The patient agreed to proceed with the recommended operative procedure (Neck exploration; radioguided parathyroid surgery / parathyroidectomy; all other indicated procedures). The patient was examined and the operative site was marked.    Pre-operative Sestamibi Scan done prior to operation is localizing, left isaiah-esophageal.  Procedure (s) performed:   Neck exploration   Radioguided parathyroid surgery  Parathyroidectomy  resection of LEFT SUPERIOR parathyroid adenoma  Laryngeal nerve monitoring   Findings:   One abnormal parathyroid gland identified  grossly consistent with adenoma in the LEFT SUPERIOR anatomical location   Clinically normal left inferior parathyroid gland  Clinically normal right superior parathyroid gland  Clinically normal right inferior parathyroid gland    Left superior parathyroid adenoma (14 x 10 mm; 340 mg); completely excised  intact and submitted to pathology. Frozen section: confirmatory  hypercellular parathyroid tissue     Left inferior parathyroid gland, clinically normal (5 x 4 mm); No biopsy obtained. Right superior parathyroid gland, clinically normal (6 x 4 mm); No biopsy obtained. Right inferior parathyroid gland, clinically normal (5 x 3 mm); No biopsy obtained. The right thyroid measures 4.5 x 1.5 cm. The left thyroid measures 4.0 x 1.5 cm. Isthmus measures 3 mm. No findings suspicious for malignancy. No palpable central or lateral neck adenopathy. A diligent search of the central and both lateral areas of the neck was unrevealing, specifically there was no palpable adenopathy in either the right or left lateral (Level II, III, IV) neck, or the central compartment (Level VI and VII)     Both right and left recurrent laryngeal nerves were identified and carefully preserved throughout the entire course of the operation, and both recurrent laryngeal nerves were confirmed to be structurally and functionally intact. The structural and functional integrity of the left and right recurrent laryngeal nerves was confirmed with the nerve stimulator (Storific System), as prominent audible signal was attained when both branches of the left and right recurrent laryngeal nerves were stimulated.      Excellent hemostasis confirmed at end of operation         Specimens:  Para-   thyroid  Right    Left      Type  Score   Type  Score    Upper     Upper      Parathyroid Clinically normal   (6 x 4 mm in size)  Biopsy not obtained  Frozen section: None    Parathyroid Adenoma   (14 x 10 mm in size;   weight 340 mg)   Completely excised   Frozen: Hypercellular parathyroid Counts = 466  Background: 21   Lower          Parathyroid Clinically normal   (5 x 3 mm in size)  Biopsy not obtained  Frozen section: None Parathyroid Clinically normal   (5 x 4 mm in size)  Biopsy not obtained  Frozen section: None                Operation:   The patient was escorted to the operating room   Upon arrival to the operative room, the patient, procedure, and operative site were confirmed via a pre-operative time-out. All were in agreement. The patient was placed in the supine position and general anesthesia induced without incident, using a NIM endotracheal tube for the purpose of laryngeal nerve monitoring. Prophylactic antibiotic (ANCEF 2.0 gm IVPB) was administered prior to the procedure. Beta blocker not indicated  With the patient in the supine position the arms were tucked, a pillow was placed behind the knees and the heels padded; the neck was slightly extended, and head of the operating table elevated to 30 degrees. Roll was placed behind the scapulae to create mild degree of neck flexion   Pressure ulcer prophylaxis was in effect with pressure point padding   VTE prophylaxis was also in effect with DIONNE hose and lower extremity mechanical compression devices   Sterile anterior neck prep (Chlorhexidine - alcohol) and drape   We prayed for our patient and we repeated the TIME OUT prior to commencing the operation to confirm the    correct patient,    correct operative site,    correct operative procedure, and    necessary equipment on hand to conduct the operation safely. Local anesthesia (6 ml of 50:50 mix of 1% LIDO + EPI) infiltrated subcutaneously at site of planned anterior cervical skin incision   Pre-operative serum iPTH level was 111.0 pg/ml. A 6 cm, anterior cervical incision made. Limited sub-platysmal flaps were created. [employer] Navigator GPS radio-immuno-guided surgery device was set on Tc99m and 100X   Strap musculature was  in the midline.    A dissection plane was developed posterior to the left neck strap musculature using cautery and facilitated by atraumatic [employer] retractors   A Zane López was used to displace the left thyroid lobe anteromedially as the left lateral thyroid recess was developed using electrocautery, and then gentle blunt dissection using a Kitner dissector. The fibroareolar floor was opened with electrocautery that provided access to the left posterior neck - retroesophageal space. We sought to identify the key structures for exposure of the left superior parathyroid gland the arteriovenous band situated immediately cephalad to where the left middle thyroid vein is situated. The left superior parathyroid gland was situated just posterior and lateral to the encountered left recurrent laryngeal nerve   The left superior parathyroid gland was examined in its entirety and found to be clinically abnormal in size and appearance (14 x 10 mm in size)  grossly consistent with left inferior parathyroid gland adenoma-situated adjacent to the esophagus   We took care not to disrupt the parathyroid gland capsule during the dissection, mobilization and complete evaluation of the left superior parathyroid gland. ---   We began the search for the left inferior parathyroid gland by dissecting gently low into the thyro-thymic ligament and thymus and then proceeding cephalad with the dissection. The left inferior parathyroid gland was identified and found to be clinically normal in size and appearance (5 x 4 mm in size)  soft, tan, flattened, ovoid and smoothly encapsulated  We took care not to disrupt the parathyroid gland during the dissection, mobilization and complete evaluation of the left inferior parathyroid gland. The normal appearing left inferior parathyroid gland was not biopsied  ---   Attention was then directed to the right neck. A dissection plane was developed posterior to the right strap musculature using cautery and facilitated by atraumatic retractors.    Wilian Ronaldn was used to displace the right thyroid lobe as the right lateral thyroid recess was developed using electrocautery, and then gentle blunt dissection using the Kitner dissector. The fibroareolar floor in the right posterior neck was opened with electrocautery that provided access to the right retroesophageal space. We sought to identify the key structures for exposure of the right superior parathyroid gland the arteriovenous band situated immediately cephalad to where the right middle thyroid vein is situated, and posterior and lateral to the right recurrent laryngeal nerve. The superior parathyroid gland is usually encountered at the Ligament of Gonzáles dorsolateral to the recurrent nerve. The superior parathyroid gland originates from the 4th pharyngeal pouch, and is more posterior in position relative to the more anteriorly situated inferior parathyroid gland. The inferior parathyroid gland is usually encountered anterior and medial to the recurrent laryngeal nerve, inferior to where the recurrent nerve crosses the inferior thyroid artery. The inferior parathyroid gland originates from the 3rd pharyngeal pouch. Typically (~75% of cases), the superior parathyroid gland is situated near the posterior aspect of the thyroid gland, at the level of the cricothyroid junction, in proximity to where the recurrent laryngeal nerve enters the larynx. The superior parathyroid gland is consistently situated dorsal/posterior and lateral to the recurrent laryngeal nerve. In a smaller percentage of patients (~24% of cases) the superior parathyroid gland is adjacent to the superior pole of the thyroid gland. In a very small percentage of patients (~1%), the superior parathyroid gland is in a retro-pharyngeal, para-esophageal, retro-esophageal, or posterior-superior mediastinal location. Rarely is the superior parathyroid gland situated within the substance of the superior or lateral pole of the thyroid gland.   The right superior parathyroid gland was identified and found to be clinically normal in size and appearance (6 x 4 mm in size)  soft, tan, flattened, ovoid and smoothly encapsulated. We took care not to disrupt the right superior parathyroid gland during the dissection, mobilization and complete evaluation of this parathyroid gland. The normal appearing right superior parathyroid gland was not biopsied  ---   We began the search for the right inferior parathyroid gland by dissecting gently low into the thyro-thymic ligament and apical thymus, and then proceeding cephalad with the dissection. The anatomic location of the inferior parathyroid gland is much more variable than that of the superior parathyroid gland, given the longer and more variable embryological migratory path of the 3rd pharyngeal pouch with the thymus. The inferior parathyroid gland may be embedded within the thymus, the thyroid gland, the anterior or posterior mediastinum. The most common location we find the inferior parathyroid gland (~40% of the time) is in proximity to the posterior thyroid capsule at the level of the 1st tracheal ring, close to the inferior thyroid artery - anterior and medial to the recurrent laryngeal nerve, inferior to where the recurrent nerve crosses the inferior thyroid artery. When we dont find the inferior parathyroid gland in this location we search for other possible anatomic locations  thyro-thymic ligament, within the substance of the thymus near the level of the thoracic inlet (~40% of cases the inferior parathyroid gland is found in this location). Other potential anatomic locations for the inferior parathyroid gland include lateral to the thyroid gland (15%), or in atypical or ectopic locations (carotid sheath or mid-thyroid level lateral to the sheath, or deep within the thyroid gland  2% of cases). When not identified during initial operation, post-operative imaging is performed with persistent or recurrent hyperparathyroidism to search for the abnormal inferior parathyroid gland. Anatomic areas assessed during post-operative imaging include the anterior or posterior mediastinum, posterior to the trachea and anterior to the esophagus, or posterior to the esophagus, and at or above the angle of the mandible (to assess for an undescended parathyroid gland). Dissection was performed using a combination of gentle blunt Kitner dissection as well as the tip of a right-angle clamp to identify and expose a normal-appearing right inferior parathyroid gland (5 x 3 mm in size)  soft, tan, flattened, ovoid and smoothly encapsulated. ---   Having identified four parathyroid glands, one of which was abnormal in size and appearance (the left superior parathyroid gland  grossly consistent with parathyroid adenoma), we re-directed our attention to the left neck. We took care not to disrupt the parathyroid gland during the dissection, mobilization and complete evaluation of the left superior parathyroid gland. The left superior parathyroid gland adenoma was excised (14 x 10 mm in size and 340 mg in weight with ex vivo counts of 466 pg/mg/min and background count of 21 pg/mg/min; i.e. hyperactive parathyroid), and submitted to frozen section, which was confirmatory  hypercellular parathyroid tissue   Blood was drawn after the excision of the left superior parathyroid adenoma - serum iPTH 10 minutes following left superior parathyroid adenoma resection was 22.8 pg/ml. ---  Throughout our dissection on both sides of the neck we oriented on the recurrent laryngeal nerves, and we kept the dissection on the capsule of all four parathyroid glands, taking great care not to rupture the parathyroid gland capsule. We made certain to expose the entire parathyroid gland for each one of the four glands identified in order to assess each gland fully. As we did on the left, during the dissection on the right we took care to maintain the encountered recurrent laryngeal nerve out of harms way.    With three normal appearing parathyroid glands identified (right and left inferior and right superior parathyroid glands), and one abnormal parathyroid gland excised (left superior parathyroid adenoma), and with normalization of serum iPTH after removal of the left inferior parathyroid adenoma, we elected to conclude the operation. To summarize: 10 minutes post-resection of the left superior parathyroid adenoma, serum iPTH level dropped from 111.0 pg/ml to 22.8 pg/ml, biochemically consistent with curative resection according to our pre-specified criteria:   1. > 50% drop in serum iPTH from pre-operative baseline, and   2. drop of serum iPTH into the normal range. The structural and functional integrity of the left and right recurrent laryngeal nerves was confirmed with the nerve stimulator ([employer] System), as a loud audible signal was attained when both branches of the left and right recurrent laryngeal nerves were stimulated. A diligent search of the central and both lateral areas of the neck was unrevealing, specifically there was no palpable adenopathy in either the right or left lateral (Level II, III, IV) neck, or the central compartment (Level VI and VII)   Excellent hemostasis was confirmed   The operative site was irrigated with normal saline and excellent hemostasis confirmed   Excellent hemostasis in the operative field was re-confirmed under repeated Valsalva maneuver. The operative field was irrigated, and hemostasis once again confirmed   The strap musculature (sternothyroid and sternohyoid muscles) was re-approximated in the midline with running 3-0 Vicryl suture. The platysma muscle was reconstituted with running absorbable (3-0 Vicryl) suture   The wound was irrigated with saline   The skin closure was completed with running subcuticular 5-0 monocryl suture and transversely oriented Steri-strips applied to the closed surgical incision   This was an uncomplicated operation with minimal blood loss.    The patient was extubated without incident and escorted to the PACU in stable condition with aspiration precautions in effect   Complications: None   Implants: None   Disposition:    To PACU extubated and in stable condition with aspiration precautions in effect   Tammy Gilliam MD ANGIE FACS  Farnaz Muñiz MD Principal Discharge DX:	Nausea and vomiting

## 2019-08-27 NOTE — ED ADULT NURSE NOTE - CAS DISCH ACCOMP BY
"Subjective:       Patient ID: Nicole Harris is a 49 y.o. female.    Chief Complaint: Consult ([facility] Colonoscopy/ Possible EGD)      HPI:  Ms. Harris  is seen today in consultation from Andree Crow NP-C  for colon cancer screening. She has no complaints. No nausea, vomiting, diarrhea, constipation, melena, hematochezia, change in bowel habits, change in stool characteristics, weight loss, decrease in caliber of stool, etc. No family history of colon cancer. No aggravating or alleviating factors. No other associated symptoms. No prior colonoscopy.  She is also experiencing intermittent epigastric nonradiating burning pain and esophageal dysphagia for solids but not liquids.  She has a history of constipation predominant IBS.      Allergies & Meds:  Review of patient's allergies indicates:   Allergen Reactions    Hydrocodone Nausea And Vomiting       Current Outpatient Medications   Medication Sig Dispense Refill    albuterol (PROVENTIL/VENTOLIN HFA) 90 mcg/actuation inhaler Inhale 1 puff into the lungs every 4 (four) hours as needed.   0    escitalopram oxalate (LEXAPRO) 20 MG tablet Take 20 mg by mouth once daily.   3    furosemide (LASIX) 20 MG tablet Take 20 mg by mouth once daily.       hydrOXYzine pamoate (VISTARIL) 25 MG Cap Take 25 mg by mouth every evening.       lisinopril 10 MG tablet Take 10 mg by mouth once daily.       metFORMIN (GLUCOPHAGE) 1000 MG tablet pt states she takes 500 mg po bid-the 1000mg dose "makes me sick"      naproxen (NAPROSYN) 500 MG tablet Take 1 tablet (500 mg total) by mouth 2 (two) times daily as needed (pain). 180 tablet 3    nortriptyline (PAMELOR) 25 MG capsule Take 1-2 capsules (25-50 mg total) by mouth every evening. 60 capsule 5    OXcarbazepine (TRILEPTAL) 300 MG Tab Take 300 mg by mouth 3 (three) times daily.   2    pantoprazole (PROTONIX) 40 MG tablet Take by mouth.      sodium,potassium,mag sulfates (SUPREP BOWEL PREP KIT) " 17.5-3.13-1.6 gram SolR Follow written instructions provided by Clinic 2 Bottle 0     No current facility-administered medications for this visit.      Facility-Administered Medications Ordered in Other Visits   Medication Dose Route Frequency Provider Last Rate Last Dose    0.9%  NaCl infusion   Intravenous Continuous Sherri Gardiner MD 20 mL/hr at 19 1227         PMFSHx:  Past Medical History:   Diagnosis Date    Anxiety     Bipolar disorder     Depression     Diabetes mellitus, type 2     HTN (hypertension)     Insomnia        Past Surgical History:   Procedure Laterality Date     SECTION      Injection, Steroid, Epidural - C7/T1 EPIDURAL STEROID INJECTION N/A 2019    Performed by [facility] at [facility]    Injection, Steroid, Epidural - L4/5 EPIDURAL STEROID INJECTION N/A 2019    [facility] at [facility]    Injection, Steroid, Epidural - L4/5 EPIDURAL STEROID INJECTION N/A 2019    [facility] at [facility]    INJECTION, TRIGGER POINT - CERVICAL REGION N/A 2019    Performed by [facility] at [facility]    TONSILLECTOMY         History reviewed. No pertinent family history.    Social History     Tobacco Use    Smoking status: Never Smoker    Smokeless tobacco: Never Used   Substance Use Topics    Alcohol use: Yes     Frequency: Monthly or less     Comment: occasional    Drug use: Yes     Types: Marijuana       Review of Systems   Constitutional: Negative for appetite change, chills, fatigue, fever and unexpected weight change.   HENT: Negative for congestion, dental problem, ear pain, mouth sores, postnasal drip, rhinorrhea, sore throat, tinnitus and voice change.    Eyes: Negative for photophobia, pain, discharge and visual disturbance.   Respiratory: Negative for cough, chest tightness, shortness of breath and wheezing.    Cardiovascular: Negative for chest pain, palpitations and leg swelling.   Gastrointestinal:  Negative for blood in stool, constipation, diarrhea, nausea and vomiting.   Endocrine: Negative for cold intolerance, heat intolerance, polydipsia, polyphagia and polyuria.   Genitourinary: Negative for difficulty urinating, dysuria, flank pain, frequency, hematuria and urgency.   Musculoskeletal: Negative for arthralgias, joint swelling and myalgias.   Skin: Negative for color change and rash.   Allergic/Immunologic: Negative for immunocompromised state.   Neurological: Negative for dizziness, tremors, seizures, syncope, speech difficulty, weakness, numbness and headaches.   Hematological: Negative for adenopathy. Does not bruise/bleed easily.   Psychiatric/Behavioral: Negative for agitation, confusion, hallucinations, self-injury and suicidal ideas. The patient is not nervous/anxious.        Objective:      Physical Exam   Constitutional: She is oriented to person, place, and time. She appears well-developed and well-nourished. She is active.  Non-toxic appearance. No distress.   Body mass index is 32.65 kg/m².     HENT:   Head: Normocephalic and atraumatic.   Right Ear: Hearing and external ear normal. No drainage or tenderness.   Left Ear: Hearing and external ear normal. No drainage or tenderness.   Nose: Nose normal. No rhinorrhea. No epistaxis.   Mouth/Throat: Uvula is midline, oropharynx is clear and moist and mucous membranes are normal. Mucous membranes are not pale, not dry and not cyanotic. No oropharyngeal exudate.   Eyes: Pupils are equal, round, and reactive to light. Conjunctivae and lids are normal. Right eye exhibits no discharge and no exudate. Left eye exhibits no discharge and no exudate. Right conjunctiva is not injected. Right eye exhibits no nystagmus. Left eye exhibits no nystagmus.   Neck: Trachea normal, full passive range of motion without pain and phonation normal. No JVD present. Carotid bruit is not present. No tracheal deviation present. No thyroid mass and no thyromegaly present.    Cardiovascular: Normal rate, regular rhythm, S1 normal, S2 normal, normal heart sounds and intact distal pulses. PMI is not displaced. Exam reveals no gallop and no friction rub.   No murmur heard.  Pulmonary/Chest: Effort normal and breath sounds normal. No accessory muscle usage. No respiratory distress. She exhibits no mass, no tenderness and no crepitus. Right breast exhibits no inverted nipple, no mass, no nipple discharge, no skin change and no tenderness. Left breast exhibits no inverted nipple, no mass, no nipple discharge, no skin change and no tenderness. Breasts are symmetrical.   Abdominal: Soft. Normal appearance and bowel sounds are normal. She exhibits no distension, no fluid wave, no abdominal bruit and no mass. There is no hepatosplenomegaly. There is no tenderness. There is no rebound, no guarding and negative Escobar's sign. No hernia. Hernia confirmed negative in the right inguinal area and confirmed negative in the left inguinal area.   Genitourinary: Rectum normal and vagina normal. There is no rash or lesion on the right labia. There is no rash or lesion on the left labia. Right adnexum displays no mass. Left adnexum displays no mass. No vaginal discharge found.   Musculoskeletal:        Cervical back: Normal.        Thoracic back: Normal.        Lumbar back: Normal.        Right upper arm: Normal.        Left upper arm: Normal.        Right forearm: Normal.        Left forearm: Normal.        Right hand: Normal.        Left hand: Normal.        Right upper leg: Normal.        Left upper leg: Normal.        Right lower leg: Normal.        Left lower leg: Normal.        Right foot: Normal.        Left foot: Normal.   Lymphadenopathy:        Head (right side): No submental, no submandibular and no posterior auricular adenopathy present.        Head (left side): No submental, no submandibular and no posterior auricular adenopathy present.     She has no cervical adenopathy.     She has no  axillary adenopathy.        Right: No inguinal and no supraclavicular adenopathy present.        Left: No inguinal and no supraclavicular adenopathy present.   Neurological: She is alert and oriented to person, place, and time. She has normal strength. No cranial nerve deficit or sensory deficit. Coordination and gait normal. GCS eye subscore is 4. GCS verbal subscore is 5. GCS motor subscore is 6.   Skin: Skin is warm, dry and intact. No rash noted. No cyanosis. Nails show no clubbing.   Psychiatric: She has a normal mood and affect. Her speech is normal. Judgment and thought content normal. Her mood appears not anxious. Her affect is not inappropriate. She is not actively hallucinating. She does not exhibit a depressed mood.         Test Results:  Pending    Assessment:       Due for colon cancer screening.  Esophageal dysphagia.  Epigastric abdominal pain.  Heme positive stool.  Differential diagnosis includes but is not limited to esophageal neoplasm, esophagitis, esophageal ulcer, gastroesophageal reflux disease, gastritis, gastric ulcer, gastric cancer, duodenitis, duodenal ulcer, inflammatory bowel disease, diverticulosis, hemorrhoids, gastrointestinal angiodysplasias, benign gastrointestinal neoplasm, colon cancer, etc.  Options at this time include but are not limited to endoscopy, second opinion, capsule endoscopy, radiologic studies, observation, etc.  Endoscopy warranted.  Multiple comorbid issues ( chronic pain, COPD, HTN, DM ) increase the complexity of required perioperative evaluation & management, risks of complications, and likely will increase the difficulty and complexity of postoperative care, etc.  These issues must be factored in to preoperative evaluation and perioperative management.    1. Encounter for screening colonoscopy    2. Esophageal dysphagia    3. Epigastric pain    4. Other chronic pain    5. Chronic obstructive pulmonary disease, unspecified COPD type    6. Essential hypertension     7. Type 2 diabetes mellitus without complication, without long-term current use of insulin    8. Fibromyalgia        Plan:   Encounter for screening colonoscopy  -     Case Request Operating Room: COLONOSCOPY, ESOPHAGOGASTRODUODENOSCOPY (EGD)  -     Comprehensive metabolic panel; Future; Expected date: 08/27/2019  -     CBC auto differential; Future; Expected date: 08/27/2019  -     Pregnancy, urine rapid; Future; Expected date: 08/27/2019  -     EKG 12-lead; Future; Expected date: 08/27/2019    Esophageal dysphagia    Epigastric pain    Other chronic pain    Chronic obstructive pulmonary disease, unspecified COPD type    Essential hypertension    Type 2 diabetes mellitus without complication, without long-term current use of insulin    Fibromyalgia    Other orders  -     sodium,potassium,mag sulfates (SUPREP BOWEL PREP KIT) 17.5-3.13-1.6 gram SolR; Follow written instructions provided by Clinic  Dispense: 2 Bottle; Refill: 0        Follow up in about 5 weeks (around 10/1/2019) for routine post-endosocpy visit.    Counseling/Medical Decision Making:  Nicole Harris was counseled regarding the results of the evaluation thus far and the differential diagnosis.  Diagnostic and therapeutic options were discussed in detail along with the risks, benefits, possible complications, details of, and indications for each option.  Diagnoses discussed included but were not limited to: hemorrhoids, diverticulosis, cancer, IBD, IBS, benign tumors, angiodysplasias.  Options discussed included but were not limited to: colonoscopy, proctoscopy, anoscopy, sigmoidoscopy, radiologic studies, PillCam, empiric therapy, second opinion, etc. Possible complications of colonoscopy discussed included but were not limited to: bleeding, infections, endocarditis, injury to internal organs, incomplete exam, failure to diagnose cancer or other serious condition, need for emergency surgery, need for a temporary colostomy, need for additional  operations or procedures, etc.  Entire conversation was held in layman's terms.  Questions solicited and answered.  I read the consent form to her verbatim.  All unfamiliar terms were defined.  Dylan booklets were provided on colonoscopy, polyps, diverticulosis, hemorrhoids, cancer, etc.  A copy of the consent form was provided as well for her review outside the office / hospital prior to the procedure.  At the conclusion of the conversation she voiced complete understanding of all we discussed and satisfaction that all of her questions had been answered to her full understanding.  She stated that she felt fully informed and totally capable of making an informed decision.  She desires and requests to proceed with colonoscopy.    Total face-to-face encounter time was 60 minutes, 40 minutes spent counseling as outlined/summarized above.              Self

## 2020-01-22 ENCOUNTER — HOSPITAL ENCOUNTER (EMERGENCY)
Dept: HOSPITAL 74 - JER | Age: 25
Discharge: HOME | End: 2020-01-22
Payer: COMMERCIAL

## 2020-01-22 VITALS — SYSTOLIC BLOOD PRESSURE: 106 MMHG | HEART RATE: 83 BPM | TEMPERATURE: 98.3 F | DIASTOLIC BLOOD PRESSURE: 64 MMHG

## 2020-01-22 VITALS — BODY MASS INDEX: 18.8 KG/M2

## 2020-01-22 DIAGNOSIS — R11.2: ICD-10-CM
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
## 2020-01-22 DIAGNOSIS — O26.891: Primary | ICD-10-CM

## 2020-01-22 DIAGNOSIS — R42: ICD-10-CM
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
## 2020-01-22 DIAGNOSIS — Z3A.09: ICD-10-CM

## 2020-01-22 DIAGNOSIS — E87.6: ICD-10-CM

## 2020-01-22 LAB
ALBUMIN SERPL-MCNC: 3.8 G/DL (ref 3.4–5)
ALP SERPL-CCNC: 59 U/L (ref 45–117)
ALT SERPL-CCNC: 19 U/L (ref 13–61)
ANION GAP SERPL CALC-SCNC: 11 MMOL/L (ref 8–16)
AST SERPL-CCNC: 16 U/L (ref 15–37)
BASOPHILS # BLD: 0.7 % (ref 0–2)
BILIRUB SERPL-MCNC: 0.4 MG/DL (ref 0.2–1)
BUN SERPL-MCNC: 12 MG/DL (ref 7–18)
CALCIUM SERPL-MCNC: 9.1 MG/DL (ref 8.5–10.1)
CHLORIDE SERPL-SCNC: 101 MMOL/L (ref 98–107)
CO2 SERPL-SCNC: 23 MMOL/L (ref 21–32)
CREAT SERPL-MCNC: 0.5 MG/DL (ref 0.55–1.3)
DEPRECATED RDW RBC AUTO: 15.5 % (ref 11.6–15.6)
EOSINOPHIL # BLD: 0.3 % (ref 0–4.5)
GLUCOSE SERPL-MCNC: 62 MG/DL (ref 74–106)
HCT VFR BLD CALC: 33.8 % (ref 32.4–45.2)
HGB BLD-MCNC: 11.3 GM/DL (ref 10.7–15.3)
LYMPHOCYTES # BLD: 18.3 % (ref 8–40)
MAGNESIUM SERPL-MCNC: 2.3 MG/DL (ref 1.8–2.4)
MCH RBC QN AUTO: 29.4 PG (ref 25.7–33.7)
MCHC RBC AUTO-ENTMCNC: 33.3 G/DL (ref 32–36)
MCV RBC: 88.2 FL (ref 80–96)
MONOCYTES # BLD AUTO: 7.1 % (ref 3.8–10.2)
NEUTROPHILS # BLD: 73.6 % (ref 42.8–82.8)
PLATELET # BLD AUTO: 234 K/MM3 (ref 134–434)
PMV BLD: 7.8 FL (ref 7.5–11.1)
POTASSIUM SERPLBLD-SCNC: 3.3 MMOL/L (ref 3.5–5.1)
PROT SERPL-MCNC: 7.2 G/DL (ref 6.4–8.2)
RBC # BLD AUTO: 3.83 M/MM3 (ref 3.6–5.2)
SODIUM SERPL-SCNC: 135 MMOL/L (ref 136–145)
WBC # BLD AUTO: 6.2 K/MM3 (ref 4–10)

## 2020-01-22 PROCEDURE — 3E0337Z INTRODUCTION OF ELECTROLYTIC AND WATER BALANCE SUBSTANCE INTO PERIPHERAL VEIN, PERCUTANEOUS APPROACH: ICD-10-PCS

## 2020-01-22 PROCEDURE — 3E033GC INTRODUCTION OF OTHER THERAPEUTIC SUBSTANCE INTO PERIPHERAL VEIN, PERCUTANEOUS APPROACH: ICD-10-PCS
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
## 2020-02-13 ENCOUNTER — APPOINTMENT (OUTPATIENT)
Dept: ANTEPARTUM | Facility: CLINIC | Age: 25
End: 2020-02-13
Payer: MEDICAID

## 2020-02-13 PROCEDURE — 76801 OB US < 14 WKS SINGLE FETUS: CPT

## 2020-02-13 PROCEDURE — 76813 OB US NUCHAL MEAS 1 GEST: CPT

## 2020-03-03 PROBLEM — Z00.00 ENCOUNTER FOR PREVENTIVE HEALTH EXAMINATION: Status: ACTIVE | Noted: 2020-03-03

## 2020-03-11 ENCOUNTER — APPOINTMENT (OUTPATIENT)
Dept: ANTEPARTUM | Facility: CLINIC | Age: 25
End: 2020-03-11
Payer: MEDICAID

## 2020-03-11 PROCEDURE — 76805 OB US >/= 14 WKS SNGL FETUS: CPT

## 2020-03-11 PROCEDURE — 76817 TRANSVAGINAL US OBSTETRIC: CPT

## 2020-03-23 ENCOUNTER — APPOINTMENT (OUTPATIENT)
Dept: MATERNAL FETAL MEDICINE | Facility: CLINIC | Age: 25
End: 2020-03-23

## 2020-03-24 ENCOUNTER — APPOINTMENT (OUTPATIENT)
Dept: MATERNAL FETAL MEDICINE | Facility: CLINIC | Age: 25
End: 2020-03-24
Payer: MEDICAID

## 2020-03-24 DIAGNOSIS — O98.319 OTHER INFECTIONS WITH A PREDOMINANTLY SEXUAL MODE OF TRANSMISSION COMPLICATING PREGNANCY, UNSPECIFIED TRIMESTER: ICD-10-CM

## 2020-03-24 DIAGNOSIS — Z78.9 OTHER SPECIFIED HEALTH STATUS: ICD-10-CM

## 2020-03-24 DIAGNOSIS — O21.9 VOMITING OF PREGNANCY, UNSPECIFIED: ICD-10-CM

## 2020-03-24 DIAGNOSIS — F41.9 OTHER MENTAL DISORDERS COMPLICATING PREGNANCY, UNSPECIFIED TRIMESTER: ICD-10-CM

## 2020-03-24 DIAGNOSIS — Z80.49 FAMILY HISTORY OF MALIGNANT NEOPLASM OF OTHER GENITAL ORGANS: ICD-10-CM

## 2020-03-24 DIAGNOSIS — Z86.19 PERSONAL HISTORY OF OTHER INFECTIOUS AND PARASITIC DISEASES: ICD-10-CM

## 2020-03-24 DIAGNOSIS — O99.340 OTHER MENTAL DISORDERS COMPLICATING PREGNANCY, UNSPECIFIED TRIMESTER: ICD-10-CM

## 2020-03-24 DIAGNOSIS — Z59.0 HOMELESSNESS: ICD-10-CM

## 2020-03-24 DIAGNOSIS — F31.9 OTHER MENTAL DISORDERS COMPLICATING PREGNANCY, UNSPECIFIED TRIMESTER: ICD-10-CM

## 2020-03-24 DIAGNOSIS — Z80.3 FAMILY HISTORY OF MALIGNANT NEOPLASM OF BREAST: ICD-10-CM

## 2020-03-24 DIAGNOSIS — F43.10 POST-TRAUMATIC STRESS DISORDER, UNSPECIFIED: ICD-10-CM

## 2020-03-24 DIAGNOSIS — A60.09 OTHER INFECTIONS WITH A PREDOMINANTLY SEXUAL MODE OF TRANSMISSION COMPLICATING PREGNANCY, UNSPECIFIED TRIMESTER: ICD-10-CM

## 2020-03-24 PROCEDURE — 99205 OFFICE O/P NEW HI 60 MIN: CPT | Mod: 95

## 2020-03-24 RX ORDER — ESCITALOPRAM OXALATE 10 MG/1
10 TABLET ORAL DAILY
Qty: 90 | Refills: 3 | Status: ACTIVE | COMMUNITY
Start: 2020-03-24 | End: 1900-01-01

## 2020-03-24 RX ORDER — ESCITALOPRAM OXALATE 20 MG/1
20 TABLET ORAL DAILY
Qty: 90 | Refills: 3 | Status: ACTIVE | COMMUNITY
Start: 2020-03-24 | End: 1900-01-01

## 2020-03-24 RX ORDER — ONDANSETRON 8 MG/1
8 TABLET, ORALLY DISINTEGRATING ORAL EVERY 8 HOURS
Qty: 30 | Refills: 5 | Status: ACTIVE | COMMUNITY
Start: 2020-03-24 | End: 1900-01-01

## 2020-03-24 RX ORDER — ONDANSETRON HYDROCHLORIDE 4 MG/1
TABLET, FILM COATED ORAL
Refills: 0 | Status: ACTIVE | COMMUNITY

## 2020-03-24 RX ORDER — PYRIDOXINE HCL (VITAMIN B6) 50 MG
50 TABLET ORAL DAILY
Qty: 180 | Refills: 2 | Status: ACTIVE | COMMUNITY
Start: 2020-03-24 | End: 1900-01-01

## 2020-03-24 RX ORDER — BUPROPION HYDROCHLORIDE 150 MG/1
150 TABLET, EXTENDED RELEASE ORAL DAILY
Qty: 90 | Refills: 3 | Status: ACTIVE | COMMUNITY
Start: 2020-03-24 | End: 1900-01-01

## 2020-03-24 SDOH — ECONOMIC STABILITY - HOUSING INSECURITY: HOMELESSNESS: Z59.0

## 2020-03-24 NOTE — PAST MEDICAL HISTORY
[HIV Infection] : no HIV [Syphilis] : no syphilis [Human Papilloma Virus Infection] : no genital warts [Hepatitis, B Virus] : no Hepatitis B [Hepatitis, C Virus] : no Hepatitis C [Trichomoniasis] : no trichomoniasis

## 2020-03-25 PROBLEM — F43.10 POST TRAUMATIC STRESS DISORDER (PTSD): Status: ACTIVE | Noted: 2020-03-24

## 2020-03-25 PROBLEM — Z59.0 HOMELESS SINGLE PERSON: Status: ACTIVE | Noted: 2020-03-25

## 2020-03-25 PROBLEM — O21.9 NAUSEA AND VOMITING DURING PREGNANCY: Status: ACTIVE | Noted: 2020-03-24

## 2020-03-25 PROBLEM — O99.340 BIPOLAR DISEASE DURING PREGNANCY, ANTEPARTUM: Status: ACTIVE | Noted: 2020-03-24

## 2020-03-25 PROBLEM — O99.340 ANXIETY IN PREGNANCY, ANTEPARTUM: Status: ACTIVE | Noted: 2020-03-24

## 2020-03-25 NOTE — DATA REVIEWED
[FreeTextEntry1] : Notes dated 2/13/2020 and 10/2/2019 from EM Carmichael and Faith Linton MD were reviewed. I have requested these notes to be scanned to her medical record. \par

## 2020-03-25 NOTE — REVIEW OF SYSTEMS
[Vomiting] : vomiting [Sleep Disturbances] : sleep disturbances [Anxiety] : anxiety [Depression] : depression [Nl] : Hematologic/Lymphatic [Abdominal Pain] : no abdominal pain [Constipation] : no constipation [Diarrhea] : no diarrhea [Heartburn] : no heartburn
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
## 2020-03-25 NOTE — DISCUSSION/SUMMARY
[FreeTextEntry1] : This consultation was performed using iPhone Smart Plate application to decrease Ms. Scott's risk of exposure to COVID 19. She gave me verbal consent to use this platform for the consultation. I was physically located in my home and she was physically located in her boyfriend's home at the time of the consultation. No other people participated in the consultation. \par \par Her problems and my suggestions for her management are summarized below.  She was extensively counseled via Smart Plate regarding the following issues:\par \par \par History of psychiatric disorder:\par \par During pregnancy and the postpartum period, women with a history of psychiatric illness are at risk of recurrence of psychiatric symptoms and disorders, including mood, anxiety, and psychotic disorders. \par \par I discussed the importance of close psychiatric care with a psychiatrist and psychologist or counselor in this pregnancy and the postpartum period. I reached out to the  psychiatry team at [facility] who were able to contact Ms. Scott to establish care. A referral for [facility], a [facility] program for women with postpartum depression and mood disorders, was submitted and Ms. Scott was given their contact information. \par \par There are many SSRIs and psychotropic medications that have excellent safety profiles in pregnancy. I explained that we can monitor for potential side effects such as fetal growth restriction and treat  withdrawal if necessary.  maternal psychiatric illness is associated with adverse pregnancy and  outcomes and I explained that her safety and wellbeing is the most important consideration to determine need for treatment in pregnancy and the postpartum period. Ms. Georges has experienced worsening of her symptoms since she stopped her medications. Prescriptions for escitalopram and bupropion were sent to her pharmacy today with instructions to resume her previous dose. She expressed to me that mirtazapine was causing her to feel drowsy and tired and so she did not want to resume that medication. \par \par She also expressed to me that she is interested in a new housing situation and desires to move to a new shelter. I am attempting to make arrangements for her at either [facility] or through [facility] resources available by calling [phone]. \par \par \par Hyperemesis gravidarum:\par \par I discussed trying small, frequent meals with increased consumption of carbohydrates. A prescription for ondansetron and vitamin B6 was sent to her pharmacy today. She understands to call her doctor immediately if she is unable to tolerate food or liquid orally. \par \par \par \par Genital herpes:\par \par Daily valacyclovir starting at 36 weeks gestational age (or clinically indicated sooner if  delivery is suspected) is suggested for suppression and to avoid active infection and need for  delivery. \par \par \par \par At the end of our visit, the patient indicated that her questions were answered and she seemed satisfied with our discussion. Approximately 60 minutes were spent with the patient on FaceTime with additional time spent for coordination of care. Please do not hesitate to contact me with any questions.\par \par \par

## 2020-03-25 NOTE — SURGICAL HISTORY
[Abn Paps] : abnormal pap smear [STI's] : STI's [Last Pap: ___] : Last Pap: [unfilled] [Last Mammo: ___] : Last Mammo: [unfilled] [Fibroids] : no fibroids [Breast Disease] : no breast disease [Infertility] : no infertility [Cysts] : no cysts [OC Use] : no OC use [de-identified] : Gonorrhea and chlamydia diagnosed in last pregnancy, treated and partner (at the time) was treated. Abnormal pap smear when she was younger (age 16). Took 2 - 3 Plan B pills after unprotected intercourse that caused this pregnancy.
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
## 2020-03-25 NOTE — HISTORY OF PRESENT ILLNESS
[FreeTextEntry1] : Thank you for referring Ms. Judith Scott for Maternal Fetal Medicine pregnancy consultation for her history of psychiatric illness. She is a 25 year old  at 18 weeks gestational age with post traumatic stress disorder, bipolar disorder, and hyperemesis. \par \par She is following with a psychiatrist who abruptly discontinued all of her medications in early pregnancy. Since, she reports worsening symptoms and has started smoking marijuana again as a self-coping mechanism. She denies suicidal or homicidal ideation.  \par \par Ms. Georges has never had a blood transfusion but has no objection to blood transfusion in case of emergency. \par \par \par  [Patient travelled to an area with active Zika Virus transmission during pregnancy or 8 weeks before getting pregnant] : Patient stated she did not travel to an area with active Zika virus transmission during pregnancy or 8 weeks before getting pregnant [Patient had sex without a condom with a man who traveled to, or reside in, an area with active Zika Virus transmission during pregnancy] : Patient did not have sex without a condom with a man who traveled to, or reside in, an area with active Zika Virus transmission during pregnancy

## 2020-04-15 ENCOUNTER — APPOINTMENT (OUTPATIENT)
Dept: ANTEPARTUM | Facility: CLINIC | Age: 25
End: 2020-04-15

## 2020-04-20 ENCOUNTER — APPOINTMENT (OUTPATIENT)
Dept: ANTEPARTUM | Facility: CLINIC | Age: 25
End: 2020-04-20
Payer: MEDICAID

## 2020-04-20 ENCOUNTER — TRANSCRIPTION ENCOUNTER (OUTPATIENT)
Age: 25
End: 2020-04-20

## 2020-04-20 PROCEDURE — 76805 OB US >/= 14 WKS SNGL FETUS: CPT

## 2020-04-21 ENCOUNTER — APPOINTMENT (OUTPATIENT)
Dept: ANTEPARTUM | Facility: CLINIC | Age: 25
End: 2020-04-21
Payer: MEDICAID

## 2020-04-21 PROCEDURE — 76946 ECHO GUIDE FOR AMNIOCENTESIS: CPT

## 2020-04-21 PROCEDURE — 59000 AMNIOCENTESIS DIAGNOSTIC: CPT

## 2020-05-19 ENCOUNTER — APPOINTMENT (OUTPATIENT)
Dept: ANTEPARTUM | Facility: CLINIC | Age: 25
End: 2020-05-19

## 2020-05-20 ENCOUNTER — APPOINTMENT (OUTPATIENT)
Dept: ANTEPARTUM | Facility: CLINIC | Age: 25
End: 2020-05-20
Payer: MEDICAID

## 2020-05-20 DIAGNOSIS — O36.8390 MATERNAL CARE FOR ABNORMALITIES OF THE FETAL HEART RATE OR RHYTHM, UNSPECIFIED TRIMESTER, NOT APPLICABLE OR UNSPECIFIED: ICD-10-CM

## 2020-05-20 PROCEDURE — 76817 TRANSVAGINAL US OBSTETRIC: CPT

## 2020-05-20 PROCEDURE — 76816 OB US FOLLOW-UP PER FETUS: CPT

## 2020-05-20 PROCEDURE — 76819 FETAL BIOPHYS PROFIL W/O NST: CPT

## 2020-06-03 ENCOUNTER — APPOINTMENT (OUTPATIENT)
Dept: ANTEPARTUM | Facility: CLINIC | Age: 25
End: 2020-06-03

## 2020-06-08 ENCOUNTER — APPOINTMENT (OUTPATIENT)
Dept: ANTEPARTUM | Facility: CLINIC | Age: 25
End: 2020-06-08
Payer: MEDICAID

## 2020-06-08 PROCEDURE — 76816 OB US FOLLOW-UP PER FETUS: CPT

## 2020-06-12 ENCOUNTER — APPOINTMENT (OUTPATIENT)
Dept: PEDIATRIC CARDIOLOGY | Facility: CLINIC | Age: 25
End: 2020-06-12
Payer: MEDICAID

## 2020-06-12 PROCEDURE — 99203 OFFICE O/P NEW LOW 30 MIN: CPT | Mod: 25

## 2020-06-12 PROCEDURE — 76825 ECHO EXAM OF FETAL HEART: CPT

## 2020-06-12 PROCEDURE — 93325 DOPPLER ECHO COLOR FLOW MAPG: CPT | Mod: 59

## 2020-06-12 PROCEDURE — 76827 ECHO EXAM OF FETAL HEART: CPT

## 2020-06-12 PROCEDURE — 76821 MIDDLE CEREBRAL ARTERY ECHO: CPT

## 2020-06-12 PROCEDURE — 76820 UMBILICAL ARTERY ECHO: CPT

## 2020-06-15 ENCOUNTER — APPOINTMENT (OUTPATIENT)
Dept: ANTEPARTUM | Facility: CLINIC | Age: 25
End: 2020-06-15

## 2020-06-16 ENCOUNTER — APPOINTMENT (OUTPATIENT)
Dept: ANTEPARTUM | Facility: CLINIC | Age: 25
End: 2020-06-16
Payer: MEDICAID

## 2020-06-16 PROCEDURE — 76816 OB US FOLLOW-UP PER FETUS: CPT

## 2020-06-16 PROCEDURE — 76817 TRANSVAGINAL US OBSTETRIC: CPT

## 2020-06-16 PROCEDURE — 76819 FETAL BIOPHYS PROFIL W/O NST: CPT

## 2020-07-02 ENCOUNTER — INPATIENT (INPATIENT)
Facility: HOSPITAL | Age: 25
LOS: 3 days | Discharge: HOME | DRG: 831 | End: 2020-07-06
Attending: OBSTETRICS & GYNECOLOGY | Admitting: OBSTETRICS & GYNECOLOGY
Payer: COMMERCIAL

## 2020-07-02 VITALS — HEIGHT: 62 IN | WEIGHT: 108.03 LBS

## 2020-07-02 DIAGNOSIS — Z3A.00 WEEKS OF GESTATION OF PREGNANCY NOT SPECIFIED: ICD-10-CM

## 2020-07-02 DIAGNOSIS — O26.899 OTHER SPECIFIED PREGNANCY RELATED CONDITIONS, UNSPECIFIED TRIMESTER: ICD-10-CM

## 2020-07-02 LAB
ANION GAP SERPL CALC-SCNC: 13 MMOL/L — SIGNIFICANT CHANGE UP (ref 5–17)
APPEARANCE UR: ABNORMAL
BILIRUB UR-MCNC: NEGATIVE — SIGNIFICANT CHANGE UP
BUN SERPL-MCNC: 7 MG/DL — SIGNIFICANT CHANGE UP (ref 7–23)
CALCIUM SERPL-MCNC: 9.2 MG/DL — SIGNIFICANT CHANGE UP (ref 8.4–10.5)
CHLORIDE SERPL-SCNC: 101 MMOL/L — SIGNIFICANT CHANGE UP (ref 96–108)
CO2 SERPL-SCNC: 18 MMOL/L — LOW (ref 22–31)
COLOR SPEC: YELLOW — SIGNIFICANT CHANGE UP
CREAT SERPL-MCNC: 0.39 MG/DL — LOW (ref 0.5–1.3)
DIFF PNL FLD: NEGATIVE — SIGNIFICANT CHANGE UP
GLUCOSE BLDC GLUCOMTR-MCNC: 104 MG/DL — HIGH (ref 70–99)
GLUCOSE SERPL-MCNC: 83 MG/DL — SIGNIFICANT CHANGE UP (ref 70–99)
GLUCOSE UR QL: NEGATIVE — SIGNIFICANT CHANGE UP
HCT VFR BLD CALC: 30.3 % — LOW (ref 34.5–45)
HGB BLD-MCNC: 9.8 G/DL — LOW (ref 11.5–15.5)
KETONES UR-MCNC: >=80 MG/DL
LEUKOCYTE ESTERASE UR-ACNC: ABNORMAL
MCHC RBC-ENTMCNC: 28.5 PG — SIGNIFICANT CHANGE UP (ref 27–34)
MCHC RBC-ENTMCNC: 32.3 GM/DL — SIGNIFICANT CHANGE UP (ref 32–36)
MCV RBC AUTO: 88.1 FL — SIGNIFICANT CHANGE UP (ref 80–100)
NITRITE UR-MCNC: NEGATIVE — SIGNIFICANT CHANGE UP
NRBC # BLD: 0 /100 WBCS — SIGNIFICANT CHANGE UP (ref 0–0)
PCP SPEC-MCNC: SIGNIFICANT CHANGE UP
PH UR: 6 — SIGNIFICANT CHANGE UP (ref 5–8)
PLATELET # BLD AUTO: 248 K/UL — SIGNIFICANT CHANGE UP (ref 150–400)
POTASSIUM SERPL-MCNC: 3.6 MMOL/L — SIGNIFICANT CHANGE UP (ref 3.5–5.3)
POTASSIUM SERPL-SCNC: 3.6 MMOL/L — SIGNIFICANT CHANGE UP (ref 3.5–5.3)
PROT UR-MCNC: ABNORMAL MG/DL
RBC # BLD: 3.44 M/UL — LOW (ref 3.8–5.2)
RBC # FLD: 14.7 % — HIGH (ref 10.3–14.5)
SARS-COV-2 RNA SPEC QL NAA+PROBE: DETECTED
SODIUM SERPL-SCNC: 132 MMOL/L — LOW (ref 135–145)
SP GR SPEC: >=1.03 — SIGNIFICANT CHANGE UP (ref 1–1.03)
UROBILINOGEN FLD QL: 0.2 E.U./DL — SIGNIFICANT CHANGE UP
WBC # BLD: 8.96 K/UL — SIGNIFICANT CHANGE UP (ref 3.8–10.5)
WBC # FLD AUTO: 8.96 K/UL — SIGNIFICANT CHANGE UP (ref 3.8–10.5)

## 2020-07-02 RX ORDER — SENNA PLUS 8.6 MG/1
2 TABLET ORAL AT BEDTIME
Refills: 0 | Status: DISCONTINUED | OUTPATIENT
Start: 2020-07-02 | End: 2020-07-06

## 2020-07-02 RX ORDER — PROGESTERONE 200 MG/1
200 CAPSULE, LIQUID FILLED ORAL EVERY 24 HOURS
Refills: 0 | Status: DISCONTINUED | OUTPATIENT
Start: 2020-07-02 | End: 2020-07-06

## 2020-07-02 RX ORDER — ASCORBIC ACID 60 MG
500 TABLET,CHEWABLE ORAL DAILY
Refills: 0 | Status: DISCONTINUED | OUTPATIENT
Start: 2020-07-02 | End: 2020-07-06

## 2020-07-02 RX ORDER — POLYETHYLENE GLYCOL 3350 17 G/17G
17 POWDER, FOR SOLUTION ORAL DAILY
Refills: 0 | Status: DISCONTINUED | OUTPATIENT
Start: 2020-07-02 | End: 2020-07-06

## 2020-07-02 RX ORDER — FERROUS SULFATE 325(65) MG
325 TABLET ORAL THREE TIMES A DAY
Refills: 0 | Status: DISCONTINUED | OUTPATIENT
Start: 2020-07-02 | End: 2020-07-05

## 2020-07-02 RX ORDER — FOLIC ACID 0.8 MG
1 TABLET ORAL DAILY
Refills: 0 | Status: DISCONTINUED | OUTPATIENT
Start: 2020-07-02 | End: 2020-07-02

## 2020-07-02 RX ORDER — SODIUM CHLORIDE 9 MG/ML
1000 INJECTION, SOLUTION INTRAVENOUS
Refills: 0 | Status: DISCONTINUED | OUTPATIENT
Start: 2020-07-02 | End: 2020-07-02

## 2020-07-02 RX ORDER — FERROUS SULFATE 325(65) MG
325 TABLET ORAL DAILY
Refills: 0 | Status: DISCONTINUED | OUTPATIENT
Start: 2020-07-02 | End: 2020-07-02

## 2020-07-02 RX ORDER — SODIUM CHLORIDE 9 MG/ML
3 INJECTION INTRAMUSCULAR; INTRAVENOUS; SUBCUTANEOUS EVERY 8 HOURS
Refills: 0 | Status: DISCONTINUED | OUTPATIENT
Start: 2020-07-02 | End: 2020-07-06

## 2020-07-02 RX ORDER — ONDANSETRON 8 MG/1
8 TABLET, FILM COATED ORAL ONCE
Refills: 0 | Status: COMPLETED | OUTPATIENT
Start: 2020-07-02 | End: 2020-07-02

## 2020-07-02 RX ADMIN — ONDANSETRON 8 MILLIGRAM(S): 8 TABLET, FILM COATED ORAL at 17:00

## 2020-07-02 RX ADMIN — SODIUM CHLORIDE 125 MILLILITER(S): 9 INJECTION, SOLUTION INTRAVENOUS at 17:00

## 2020-07-02 RX ADMIN — SODIUM CHLORIDE 3 MILLILITER(S): 9 INJECTION INTRAMUSCULAR; INTRAVENOUS; SUBCUTANEOUS at 23:35

## 2020-07-02 NOTE — PATIENT PROFILE OB - BELONGINGS, PROFILE
-Stable at baseline oxygen requirement.  Continue bronchodilators per RT protocol, and oxygen supplements.   none

## 2020-07-03 DIAGNOSIS — F33.1 MAJOR DEPRESSIVE DISORDER, RECURRENT, MODERATE: ICD-10-CM

## 2020-07-03 LAB
BLD GP AB SCN SERPL QL: NEGATIVE — SIGNIFICANT CHANGE UP
RH IG SCN BLD-IMP: POSITIVE — SIGNIFICANT CHANGE UP
RH IG SCN BLD-IMP: POSITIVE — SIGNIFICANT CHANGE UP

## 2020-07-03 PROCEDURE — 99222 1ST HOSP IP/OBS MODERATE 55: CPT

## 2020-07-03 RX ORDER — FERROUS SULFATE 325(65) MG
325 TABLET ORAL DAILY
Refills: 0 | Status: DISCONTINUED | OUTPATIENT
Start: 2020-07-03 | End: 2020-07-05

## 2020-07-03 RX ORDER — FOLIC ACID 0.8 MG
1 TABLET ORAL DAILY
Refills: 0 | Status: DISCONTINUED | OUTPATIENT
Start: 2020-07-03 | End: 2020-07-06

## 2020-07-03 RX ORDER — SODIUM CHLORIDE 9 MG/ML
1000 INJECTION, SOLUTION INTRAVENOUS
Refills: 0 | Status: DISCONTINUED | OUTPATIENT
Start: 2020-07-03 | End: 2020-07-04

## 2020-07-03 RX ORDER — ESCITALOPRAM OXALATE 10 MG/1
10 TABLET, FILM COATED ORAL DAILY
Refills: 0 | Status: DISCONTINUED | OUTPATIENT
Start: 2020-07-03 | End: 2020-07-06

## 2020-07-03 RX ADMIN — SODIUM CHLORIDE 3 MILLILITER(S): 9 INJECTION INTRAMUSCULAR; INTRAVENOUS; SUBCUTANEOUS at 06:21

## 2020-07-03 RX ADMIN — SENNA PLUS 2 TABLET(S): 8.6 TABLET ORAL at 00:01

## 2020-07-03 RX ADMIN — Medication 1 TABLET(S): at 12:38

## 2020-07-03 RX ADMIN — SENNA PLUS 2 TABLET(S): 8.6 TABLET ORAL at 21:46

## 2020-07-03 RX ADMIN — PROGESTERONE 200 MILLIGRAM(S): 200 CAPSULE, LIQUID FILLED ORAL at 23:56

## 2020-07-03 RX ADMIN — Medication 500 MILLIGRAM(S): at 12:38

## 2020-07-03 RX ADMIN — Medication 325 MILLIGRAM(S): at 14:47

## 2020-07-03 RX ADMIN — PROGESTERONE 200 MILLIGRAM(S): 200 CAPSULE, LIQUID FILLED ORAL at 00:01

## 2020-07-03 RX ADMIN — Medication 325 MILLIGRAM(S): at 06:21

## 2020-07-03 RX ADMIN — Medication 325 MILLIGRAM(S): at 00:01

## 2020-07-03 RX ADMIN — POLYETHYLENE GLYCOL 3350 17 GRAM(S): 17 POWDER, FOR SOLUTION ORAL at 12:38

## 2020-07-03 RX ADMIN — Medication 325 MILLIGRAM(S): at 21:46

## 2020-07-03 RX ADMIN — SODIUM CHLORIDE 3 MILLILITER(S): 9 INJECTION INTRAMUSCULAR; INTRAVENOUS; SUBCUTANEOUS at 23:54

## 2020-07-03 RX ADMIN — Medication 1 MILLIGRAM(S): at 12:38

## 2020-07-03 RX ADMIN — ESCITALOPRAM OXALATE 10 MILLIGRAM(S): 10 TABLET, FILM COATED ORAL at 18:00

## 2020-07-03 NOTE — BEHAVIORAL HEALTH ASSESSMENT NOTE - SUMMARY
30F pph MDD, PTSD, 4 suicide attempts by OD, and Cannabis Use D/o, currently admitted to OBGYN service as discharge plan is clarified; Psychiatry consulted for evaluation of depression.  Though pt self-reports a previous dx of Bipolar she had been stable for some time without mood stabilizers or antipsychotics and does not recall a specific incidence of callum or hypomania making diagnosis of Bipolar questionable; dx of MDD is more certain, however. No current dangerousness evident; at this time pt does not represent a danger to herself or others and thus does not meet criteria for Involuntary Psychiatric Hospitalization.  In this individual with 4 previous suicide attempts and mood sx worsening over the past few weeks benefits of restarting medication management of current depressive episode outweigh risks; additionally pt is requesting to be restarted on her prior medication in the safest manor possible. Pt. agrees to be restarted on Lexapro only at this time. 25F pph MDD, PTSD, 4 suicide attempts by OD, and Cannabis Use D/o, currently admitted to OBGYN service as discharge plan is clarified; Psychiatry consulted for evaluation of depression.  Though pt self-reports a previous dx of Bipolar she had been stable for some time without mood stabilizers or antipsychotics and does not recall a specific incidence of callum or hypomania making diagnosis of Bipolar questionable; dx of MDD is more certain, however. No current dangerousness evident; at this time pt does not represent a danger to herself or others and thus does not meet criteria for Involuntary Psychiatric Hospitalization.  In this individual with 4 previous suicide attempts and mood sx worsening over the past few weeks benefits of restarting medication management of current depressive episode outweigh risks; additionally pt is requesting to be restarted on her prior medication in the safest manor possible. Pt. agrees to be restarted on Lexapro only at this time.
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
## 2020-07-03 NOTE — BEHAVIORAL HEALTH ASSESSMENT NOTE - NSBHCONSULTFOLLOWAFTERCARE_PSY_A_CORE FT
Pt. will require appointment with Psychiatrist upon discharge and enough medication to last her until said appointment.

## 2020-07-03 NOTE — BEHAVIORAL HEALTH ASSESSMENT NOTE - NSBHCHARTREVIEWVS_PSY_A_CORE FT
Vital Signs Last 24 Hrs  T(C): 36.9 (03 Jul 2020 10:00), Max: 36.9 (02 Jul 2020 23:00)  T(F): 98.5 (03 Jul 2020 10:00), Max: 98.5 (03 Jul 2020 10:00)  HR: 71 (03 Jul 2020 10:00) (70 - 79)  BP: 94/59 (03 Jul 2020 10:00) (94/59 - 99/63)  BP(mean): --  RR: 17 (03 Jul 2020 10:00) (16 - 17)  SpO2: 99% (03 Jul 2020 10:00) (99% - 100%)

## 2020-07-03 NOTE — BEHAVIORAL HEALTH ASSESSMENT NOTE - RISK ASSESSMENT
Low Acute Suicide Risk Static: Hx of Attempt  Modifiable: Lack of Treatment  Protective: Fear of Death/Dying  Current Risk: UNLIKELY  Current risk due to mood episode will be mitigated by starting pt on antidepressant medication.

## 2020-07-03 NOTE — BEHAVIORAL HEALTH ASSESSMENT NOTE - PROBLEM SELECTOR PLAN 1
1) Level of Observation as per Primary Team  2) Re-start Lexapro at 10mg qdaily  3) No psychiatric contraindications to discharge  4) Pt. will require f/u with a psychiatrist upon discharge  5) Psychiatry will follow during this admission however please call with any acute concerns

## 2020-07-04 LAB
APPEARANCE UR: CLEAR — SIGNIFICANT CHANGE UP
BILIRUB UR-MCNC: NEGATIVE — SIGNIFICANT CHANGE UP
COLOR SPEC: YELLOW — SIGNIFICANT CHANGE UP
DIFF PNL FLD: ABNORMAL
GLUCOSE UR QL: NEGATIVE — SIGNIFICANT CHANGE UP
KETONES UR-MCNC: >=80 MG/DL
LEUKOCYTE ESTERASE UR-ACNC: ABNORMAL
NITRITE UR-MCNC: POSITIVE
PH UR: 7 — SIGNIFICANT CHANGE UP (ref 5–8)
PROT UR-MCNC: NEGATIVE MG/DL — SIGNIFICANT CHANGE UP
SP GR SPEC: 1.02 — SIGNIFICANT CHANGE UP (ref 1–1.03)
T PALLIDUM AB TITR SER: NEGATIVE — SIGNIFICANT CHANGE UP
UROBILINOGEN FLD QL: 0.2 E.U./DL — SIGNIFICANT CHANGE UP

## 2020-07-04 RX ORDER — CEPHALEXIN 500 MG
500 CAPSULE ORAL EVERY 12 HOURS
Refills: 0 | Status: DISCONTINUED | OUTPATIENT
Start: 2020-07-04 | End: 2020-07-06

## 2020-07-04 RX ORDER — SODIUM CHLORIDE 9 MG/ML
1000 INJECTION, SOLUTION INTRAVENOUS
Refills: 0 | Status: DISCONTINUED | OUTPATIENT
Start: 2020-07-04 | End: 2020-07-04

## 2020-07-04 RX ORDER — ACETAMINOPHEN 500 MG
1000 TABLET ORAL ONCE
Refills: 0 | Status: COMPLETED | OUTPATIENT
Start: 2020-07-04 | End: 2020-07-04

## 2020-07-04 RX ADMIN — Medication 400 MILLIGRAM(S): at 11:54

## 2020-07-04 RX ADMIN — POLYETHYLENE GLYCOL 3350 17 GRAM(S): 17 POWDER, FOR SOLUTION ORAL at 11:54

## 2020-07-04 RX ADMIN — Medication 325 MILLIGRAM(S): at 21:41

## 2020-07-04 RX ADMIN — SODIUM CHLORIDE 3 MILLILITER(S): 9 INJECTION INTRAMUSCULAR; INTRAVENOUS; SUBCUTANEOUS at 05:49

## 2020-07-04 RX ADMIN — Medication 500 MILLIGRAM(S): at 11:54

## 2020-07-04 RX ADMIN — SENNA PLUS 2 TABLET(S): 8.6 TABLET ORAL at 21:41

## 2020-07-04 RX ADMIN — ESCITALOPRAM OXALATE 10 MILLIGRAM(S): 10 TABLET, FILM COATED ORAL at 11:54

## 2020-07-04 RX ADMIN — Medication 325 MILLIGRAM(S): at 11:54

## 2020-07-04 RX ADMIN — Medication 1 MILLIGRAM(S): at 11:54

## 2020-07-04 RX ADMIN — SODIUM CHLORIDE 3 MILLILITER(S): 9 INJECTION INTRAMUSCULAR; INTRAVENOUS; SUBCUTANEOUS at 14:10

## 2020-07-04 RX ADMIN — Medication 325 MILLIGRAM(S): at 05:50

## 2020-07-04 RX ADMIN — Medication 1 TABLET(S): at 11:54

## 2020-07-04 RX ADMIN — Medication 500 MILLIGRAM(S): at 18:50

## 2020-07-05 RX ORDER — FERROUS SULFATE 325(65) MG
325 TABLET ORAL
Refills: 0 | Status: DISCONTINUED | OUTPATIENT
Start: 2020-07-05 | End: 2020-07-06

## 2020-07-05 RX ORDER — ACETAMINOPHEN 500 MG
975 TABLET ORAL EVERY 6 HOURS
Refills: 0 | Status: DISCONTINUED | OUTPATIENT
Start: 2020-07-05 | End: 2020-07-06

## 2020-07-05 RX ORDER — PHENAZOPYRIDINE HCL 100 MG
100 TABLET ORAL EVERY 8 HOURS
Refills: 0 | Status: DISCONTINUED | OUTPATIENT
Start: 2020-07-05 | End: 2020-07-06

## 2020-07-05 RX ADMIN — PROGESTERONE 200 MILLIGRAM(S): 200 CAPSULE, LIQUID FILLED ORAL at 00:06

## 2020-07-05 RX ADMIN — Medication 1 MILLIGRAM(S): at 12:43

## 2020-07-05 RX ADMIN — Medication 975 MILLIGRAM(S): at 06:24

## 2020-07-05 RX ADMIN — Medication 500 MILLIGRAM(S): at 12:43

## 2020-07-05 RX ADMIN — Medication 325 MILLIGRAM(S): at 05:51

## 2020-07-05 RX ADMIN — SENNA PLUS 2 TABLET(S): 8.6 TABLET ORAL at 22:39

## 2020-07-05 RX ADMIN — POLYETHYLENE GLYCOL 3350 17 GRAM(S): 17 POWDER, FOR SOLUTION ORAL at 12:43

## 2020-07-05 RX ADMIN — Medication 325 MILLIGRAM(S): at 19:30

## 2020-07-05 RX ADMIN — Medication 500 MILLIGRAM(S): at 19:30

## 2020-07-05 RX ADMIN — Medication 100 MILLIGRAM(S): at 10:15

## 2020-07-05 RX ADMIN — PROGESTERONE 200 MILLIGRAM(S): 200 CAPSULE, LIQUID FILLED ORAL at 22:39

## 2020-07-05 RX ADMIN — Medication 1 TABLET(S): at 12:43

## 2020-07-05 RX ADMIN — Medication 500 MILLIGRAM(S): at 05:51

## 2020-07-05 RX ADMIN — ESCITALOPRAM OXALATE 10 MILLIGRAM(S): 10 TABLET, FILM COATED ORAL at 12:43

## 2020-07-06 ENCOUNTER — TRANSCRIPTION ENCOUNTER (OUTPATIENT)
Age: 25
End: 2020-07-06

## 2020-07-06 VITALS
RESPIRATION RATE: 15 BRPM | HEART RATE: 70 BPM | OXYGEN SATURATION: 100 % | TEMPERATURE: 98 F | SYSTOLIC BLOOD PRESSURE: 99 MMHG | DIASTOLIC BLOOD PRESSURE: 53 MMHG

## 2020-07-06 LAB
-  AMPICILLIN/SULBACTAM: SIGNIFICANT CHANGE UP
-  AMPICILLIN: SIGNIFICANT CHANGE UP
-  CEFAZOLIN: SIGNIFICANT CHANGE UP
-  CEFTRIAXONE: SIGNIFICANT CHANGE UP
-  CIPROFLOXACIN: SIGNIFICANT CHANGE UP
-  GENTAMICIN: SIGNIFICANT CHANGE UP
-  NITROFURANTOIN: SIGNIFICANT CHANGE UP
-  PIPERACILLIN/TAZOBACTAM: SIGNIFICANT CHANGE UP
-  TOBRAMYCIN: SIGNIFICANT CHANGE UP
-  TRIMETHOPRIM/SULFAMETHOXAZOLE: SIGNIFICANT CHANGE UP
CULTURE RESULTS: SIGNIFICANT CHANGE UP
METHOD TYPE: SIGNIFICANT CHANGE UP
ORGANISM # SPEC MICROSCOPIC CNT: SIGNIFICANT CHANGE UP
ORGANISM # SPEC MICROSCOPIC CNT: SIGNIFICANT CHANGE UP
SPECIMEN SOURCE: SIGNIFICANT CHANGE UP

## 2020-07-06 RX ORDER — CEPHALEXIN 500 MG
1 CAPSULE ORAL
Qty: 20 | Refills: 0
Start: 2020-07-06 | End: 2020-07-10

## 2020-07-06 RX ORDER — CEPHALEXIN 500 MG
1 CAPSULE ORAL
Qty: 10 | Refills: 0
Start: 2020-07-06 | End: 2020-07-10

## 2020-07-06 RX ORDER — POLYETHYLENE GLYCOL 3350 17 G/17G
17 POWDER, FOR SOLUTION ORAL
Qty: 0 | Refills: 0 | DISCHARGE
Start: 2020-07-06

## 2020-07-06 RX ORDER — ASCORBIC ACID 60 MG
1 TABLET,CHEWABLE ORAL
Qty: 0 | Refills: 0 | DISCHARGE
Start: 2020-07-06

## 2020-07-06 RX ORDER — ASCORBIC ACID 60 MG
1 TABLET,CHEWABLE ORAL
Qty: 50 | Refills: 0
Start: 2020-07-06 | End: 2020-08-24

## 2020-07-06 RX ORDER — FERROUS SULFATE 325(65) MG
1 TABLET ORAL
Qty: 45 | Refills: 0
Start: 2020-07-06 | End: 2020-08-19

## 2020-07-06 RX ORDER — CEPHALEXIN 500 MG
500 CAPSULE ORAL EVERY 6 HOURS
Refills: 0 | Status: DISCONTINUED | OUTPATIENT
Start: 2020-07-06 | End: 2020-07-06

## 2020-07-06 RX ORDER — MIRTAZAPINE 45 MG/1
1 TABLET, ORALLY DISINTEGRATING ORAL
Qty: 30 | Refills: 0
Start: 2020-07-06 | End: 2020-08-04

## 2020-07-06 RX ORDER — FOLIC ACID 0.8 MG
1 TABLET ORAL
Qty: 50 | Refills: 0
Start: 2020-07-06 | End: 2020-08-24

## 2020-07-06 RX ORDER — PHENAZOPYRIDINE HCL 100 MG
1 TABLET ORAL
Qty: 30 | Refills: 0
Start: 2020-07-06 | End: 2020-07-15

## 2020-07-06 RX ORDER — PROGESTERONE 200 MG/1
2 CAPSULE, LIQUID FILLED ORAL
Qty: 0 | Refills: 0 | DISCHARGE
Start: 2020-07-06

## 2020-07-06 RX ORDER — ESCITALOPRAM OXALATE 10 MG/1
1 TABLET, FILM COATED ORAL
Qty: 60 | Refills: 0
Start: 2020-07-06 | End: 2020-09-03

## 2020-07-06 RX ORDER — PROGESTERONE 200 MG/1
1 CAPSULE, LIQUID FILLED ORAL
Qty: 60 | Refills: 1
Start: 2020-07-06 | End: 2020-11-02

## 2020-07-06 RX ORDER — FOLIC ACID 0.8 MG
1 TABLET ORAL
Qty: 0 | Refills: 0 | DISCHARGE
Start: 2020-07-06

## 2020-07-06 RX ORDER — ACETAMINOPHEN 500 MG
3 TABLET ORAL
Qty: 0 | Refills: 0 | DISCHARGE
Start: 2020-07-06

## 2020-07-06 RX ORDER — PROGESTERONE 200 MG/1
2 CAPSULE, LIQUID FILLED ORAL
Qty: 120 | Refills: 0
Start: 2020-07-06 | End: 2020-09-03

## 2020-07-06 RX ADMIN — Medication 100 MILLIGRAM(S): at 13:00

## 2020-07-06 RX ADMIN — Medication 1 MILLIGRAM(S): at 10:10

## 2020-07-06 RX ADMIN — Medication 500 MILLIGRAM(S): at 06:53

## 2020-07-06 RX ADMIN — Medication 325 MILLIGRAM(S): at 06:53

## 2020-07-06 RX ADMIN — Medication 500 MILLIGRAM(S): at 13:18

## 2020-07-06 RX ADMIN — Medication 1 TABLET(S): at 10:10

## 2020-07-06 RX ADMIN — ESCITALOPRAM OXALATE 10 MILLIGRAM(S): 10 TABLET, FILM COATED ORAL at 13:20

## 2020-07-06 RX ADMIN — Medication 500 MILLIGRAM(S): at 13:14

## 2020-07-06 RX ADMIN — POLYETHYLENE GLYCOL 3350 17 GRAM(S): 17 POWDER, FOR SOLUTION ORAL at 10:11

## 2020-07-06 NOTE — DISCHARGE NOTE ANTEPARTUM - HOSPITAL COURSE
Ander presented with abdominal pain and was diagnosed with a UTI. She was seen by psychiatry for depression and appropriate recommendations were made. Patent was found to be COVID positive while inpatient but was asymptomatic. Pain was improved at day of discharge and plan is to follow up with private obgyn.

## 2020-07-06 NOTE — DISCHARGE NOTE ANTEPARTUM - PATIENT PORTAL LINK FT
You can access the FollowMyHealth Patient Portal offered by [facility] by registering at the following website: [email or web address]. By joining [employer]’s FollowMyHealth portal, you will also be able to view your health information using other applications (apps) compatible with our system.

## 2020-07-06 NOTE — DISCHARGE NOTE ANTEPARTUM - MEDICATION SUMMARY - MEDICATIONS TO TAKE
I will START or STAY ON the medications listed below when I get home from the hospital:    acetaminophen 325 mg oral tablet  -- 3 tab(s) by mouth every 6 hours, As needed, Mild Pain (1 - 3)  -- Indication: For for pain    escitalopram 10 mg oral tablet  -- 1 tab(s) by mouth once a day  -- Indication: For for depression    Zofran 4 mg oral tablet  -- 1 tab(s) by mouth every 8 hours   -- Indication: For for nausea    ferrous sulfate 325 mg (65 mg elemental iron) oral tablet  -- 1 tab(s) by mouth once a day   -- Indication: For for anemia    Prenatal Multivitamins with Folic Acid 1 mg oral tablet  -- 1 tab(s) by mouth once a day  -- Indication: For vitamin    polyethylene glycol 3350 oral powder for reconstitution  -- 17 gram(s) by mouth once a day  -- Indication: For for constipation    phenazopyridine 100 mg oral tablet  -- 1 tab(s) by mouth every 8 hours, As needed, bladder discomfort  -- Indication: For for urination    progesterone 100 mg vaginal insert  -- 2 tab(s) vaginally once a day (at bedtime)  -- Indication: For for cervix     folic acid 1 mg oral tablet  -- 1 tab(s) by mouth once a day  -- Indication: For for vitamin    ascorbic acid 500 mg oral tablet  -- 1 tab(s) by mouth once a day  -- Indication: For for vitamin

## 2020-07-06 NOTE — DISCHARGE NOTE ANTEPARTUM - CARE PROVIDER_API CALL
Kinsey Stoner)  Obstetrics and Gynecology  [address] [place] [address]  Phone: [phone]  Fax: ()-  Follow Up Time:

## 2020-07-06 NOTE — DISCHARGE NOTE ANTEPARTUM - CARE PLAN
Principal Discharge DX:	Abdominal pain affecting pregnancy  Goal:	carry pregnancy to term  Assessment and plan of treatment:	Finish antibiotics for UTI and follow up with private obgyn

## 2020-07-07 ENCOUNTER — APPOINTMENT (OUTPATIENT)
Dept: ANTEPARTUM | Facility: CLINIC | Age: 25
End: 2020-07-07

## 2020-07-07 ENCOUNTER — TRANSCRIPTION ENCOUNTER (OUTPATIENT)
Age: 25
End: 2020-07-07

## 2020-07-08 ENCOUNTER — TRANSCRIPTION ENCOUNTER (OUTPATIENT)
Age: 25
End: 2020-07-08

## 2020-07-09 ENCOUNTER — TRANSCRIPTION ENCOUNTER (OUTPATIENT)
Age: 25
End: 2020-07-09

## 2020-07-14 ENCOUNTER — TRANSCRIPTION ENCOUNTER (OUTPATIENT)
Age: 25
End: 2020-07-14

## 2020-07-14 DIAGNOSIS — Z3A.20 20 WEEKS GESTATION OF PREGNANCY: ICD-10-CM

## 2020-07-14 DIAGNOSIS — O99.89 OTHER SPECIFIED DISEASES AND CONDITIONS COMPLICATING PREGNANCY, CHILDBIRTH AND THE PUERPERIUM: ICD-10-CM

## 2020-07-14 DIAGNOSIS — U07.1 COVID-19: ICD-10-CM

## 2020-07-14 DIAGNOSIS — O99.322 DRUG USE COMPLICATING PREGNANCY, SECOND TRIMESTER: ICD-10-CM

## 2020-07-14 DIAGNOSIS — O98.512 OTHER VIRAL DISEASES COMPLICATING PREGNANCY, SECOND TRIMESTER: ICD-10-CM

## 2020-07-14 DIAGNOSIS — R10.9 UNSPECIFIED ABDOMINAL PAIN: ICD-10-CM

## 2020-07-14 DIAGNOSIS — Z34.92 ENCOUNTER FOR SUPERVISION OF NORMAL PREGNANCY, UNSPECIFIED, SECOND TRIMESTER: ICD-10-CM

## 2020-07-14 DIAGNOSIS — F12.99 CANNABIS USE, UNSPECIFIED WITH UNSPECIFIED CANNABIS-INDUCED DISORDER: ICD-10-CM

## 2020-07-14 DIAGNOSIS — O26.892 OTHER SPECIFIED PREGNANCY RELATED CONDITIONS, SECOND TRIMESTER: ICD-10-CM

## 2020-07-14 DIAGNOSIS — F43.10 POST-TRAUMATIC STRESS DISORDER, UNSPECIFIED: ICD-10-CM

## 2020-07-14 DIAGNOSIS — O23.42 UNSPECIFIED INFECTION OF URINARY TRACT IN PREGNANCY, SECOND TRIMESTER: ICD-10-CM

## 2020-07-14 DIAGNOSIS — O99.342 OTHER MENTAL DISORDERS COMPLICATING PREGNANCY, SECOND TRIMESTER: ICD-10-CM

## 2020-07-14 DIAGNOSIS — O99.012 ANEMIA COMPLICATING PREGNANCY, SECOND TRIMESTER: ICD-10-CM

## 2020-07-14 DIAGNOSIS — F31.89 OTHER BIPOLAR DISORDER: ICD-10-CM

## 2020-07-14 DIAGNOSIS — Z59.0 HOMELESSNESS: ICD-10-CM

## 2020-07-14 SDOH — ECONOMIC STABILITY - HOUSING INSECURITY: HOMELESSNESS: Z59.0

## 2020-07-16 ENCOUNTER — TRANSCRIPTION ENCOUNTER (OUTPATIENT)
Age: 25
End: 2020-07-16

## 2020-07-23 ENCOUNTER — TRANSCRIPTION ENCOUNTER (OUTPATIENT)
Age: 25
End: 2020-07-23

## 2020-07-27 ENCOUNTER — APPOINTMENT (OUTPATIENT)
Dept: ANTEPARTUM | Facility: CLINIC | Age: 25
End: 2020-07-27
Payer: MEDICAID

## 2020-07-27 ENCOUNTER — OUTPATIENT (OUTPATIENT)
Dept: OUTPATIENT SERVICES | Facility: HOSPITAL | Age: 25
LOS: 1 days | End: 2020-07-27
Payer: COMMERCIAL

## 2020-07-27 DIAGNOSIS — Z3A.00 WEEKS OF GESTATION OF PREGNANCY NOT SPECIFIED: ICD-10-CM

## 2020-07-27 DIAGNOSIS — O26.899 OTHER SPECIFIED PREGNANCY RELATED CONDITIONS, UNSPECIFIED TRIMESTER: ICD-10-CM

## 2020-07-27 LAB
AMNISURE ROM (RUPTURE OF MEMBRANES): NEGATIVE — SIGNIFICANT CHANGE UP
SARS-COV-2 RNA SPEC QL NAA+PROBE: SIGNIFICANT CHANGE UP

## 2020-07-27 PROCEDURE — 76819 FETAL BIOPHYS PROFIL W/O NST: CPT

## 2020-07-27 PROCEDURE — 76816 OB US FOLLOW-UP PER FETUS: CPT

## 2020-08-10 ENCOUNTER — APPOINTMENT (OUTPATIENT)
Dept: ANTEPARTUM | Facility: CLINIC | Age: 25
End: 2020-08-10

## 2020-08-11 ENCOUNTER — APPOINTMENT (OUTPATIENT)
Dept: ANTEPARTUM | Facility: CLINIC | Age: 25
End: 2020-08-11

## 2020-08-12 ENCOUNTER — APPOINTMENT (OUTPATIENT)
Dept: ANTEPARTUM | Facility: CLINIC | Age: 25
End: 2020-08-12
Payer: MEDICAID

## 2020-08-12 PROCEDURE — 76819 FETAL BIOPHYS PROFIL W/O NST: CPT

## 2020-08-12 PROCEDURE — 76816 OB US FOLLOW-UP PER FETUS: CPT

## 2020-08-14 ENCOUNTER — INPATIENT (INPATIENT)
Facility: HOSPITAL | Age: 25
LOS: 1 days | Discharge: HOME | End: 2020-08-16
Attending: OBSTETRICS & GYNECOLOGY | Admitting: OBSTETRICS & GYNECOLOGY
Payer: COMMERCIAL

## 2020-08-14 VITALS — WEIGHT: 116.84 LBS | HEIGHT: 61 IN

## 2020-08-14 DIAGNOSIS — Z3A.00 WEEKS OF GESTATION OF PREGNANCY NOT SPECIFIED: ICD-10-CM

## 2020-08-14 DIAGNOSIS — O26.899 OTHER SPECIFIED PREGNANCY RELATED CONDITIONS, UNSPECIFIED TRIMESTER: ICD-10-CM

## 2020-08-14 LAB
ALBUMIN SERPL ELPH-MCNC: 3.5 G/DL — SIGNIFICANT CHANGE UP (ref 3.3–5)
ALP SERPL-CCNC: 358 U/L — HIGH (ref 40–120)
ALT FLD-CCNC: 14 U/L — SIGNIFICANT CHANGE UP (ref 10–45)
AMNISURE ROM (RUPTURE OF MEMBRANES): POSITIVE
ANION GAP SERPL CALC-SCNC: 11 MMOL/L — SIGNIFICANT CHANGE UP (ref 5–17)
APPEARANCE UR: CLEAR — SIGNIFICANT CHANGE UP
APTT BLD: 30.3 SEC — SIGNIFICANT CHANGE UP (ref 27.5–35.5)
AST SERPL-CCNC: 16 U/L — SIGNIFICANT CHANGE UP (ref 10–40)
BASOPHILS # BLD AUTO: 0.04 K/UL — SIGNIFICANT CHANGE UP (ref 0–0.2)
BASOPHILS NFR BLD AUTO: 0.6 % — SIGNIFICANT CHANGE UP (ref 0–2)
BILIRUB SERPL-MCNC: <0.2 MG/DL — SIGNIFICANT CHANGE UP (ref 0.2–1.2)
BILIRUB UR-MCNC: NEGATIVE — SIGNIFICANT CHANGE UP
BLD GP AB SCN SERPL QL: NEGATIVE — SIGNIFICANT CHANGE UP
BUN SERPL-MCNC: 5 MG/DL — LOW (ref 7–23)
CALCIUM SERPL-MCNC: 8.9 MG/DL — SIGNIFICANT CHANGE UP (ref 8.4–10.5)
CHLORIDE SERPL-SCNC: 103 MMOL/L — SIGNIFICANT CHANGE UP (ref 96–108)
CO2 SERPL-SCNC: 21 MMOL/L — LOW (ref 22–31)
COLOR SPEC: YELLOW — SIGNIFICANT CHANGE UP
CREAT ?TM UR-MCNC: 55 MG/DL — SIGNIFICANT CHANGE UP
CREAT SERPL-MCNC: 0.4 MG/DL — LOW (ref 0.5–1.3)
DIFF PNL FLD: ABNORMAL
EOSINOPHIL # BLD AUTO: 0.11 K/UL — SIGNIFICANT CHANGE UP (ref 0–0.5)
EOSINOPHIL NFR BLD AUTO: 1.6 % — SIGNIFICANT CHANGE UP (ref 0–6)
FIBRINOGEN PPP-MCNC: 450 MG/DL — HIGH (ref 258–438)
GLUCOSE SERPL-MCNC: 81 MG/DL — SIGNIFICANT CHANGE UP (ref 70–99)
GLUCOSE UR QL: 250
HCT VFR BLD CALC: 30 % — LOW (ref 34.5–45)
HGB BLD-MCNC: 9.4 G/DL — LOW (ref 11.5–15.5)
IMM GRANULOCYTES NFR BLD AUTO: 1 % — SIGNIFICANT CHANGE UP (ref 0–1.5)
INR BLD: 0.95 — SIGNIFICANT CHANGE UP (ref 0.88–1.16)
KETONES UR-MCNC: NEGATIVE — SIGNIFICANT CHANGE UP
LDH SERPL L TO P-CCNC: 213 U/L — SIGNIFICANT CHANGE UP (ref 50–242)
LEUKOCYTE ESTERASE UR-ACNC: NEGATIVE — SIGNIFICANT CHANGE UP
LYMPHOCYTES # BLD AUTO: 1.54 K/UL — SIGNIFICANT CHANGE UP (ref 1–3.3)
LYMPHOCYTES # BLD AUTO: 22.4 % — SIGNIFICANT CHANGE UP (ref 13–44)
MCHC RBC-ENTMCNC: 27.5 PG — SIGNIFICANT CHANGE UP (ref 27–34)
MCHC RBC-ENTMCNC: 31.3 GM/DL — LOW (ref 32–36)
MCV RBC AUTO: 87.7 FL — SIGNIFICANT CHANGE UP (ref 80–100)
MONOCYTES # BLD AUTO: 0.73 K/UL — SIGNIFICANT CHANGE UP (ref 0–0.9)
MONOCYTES NFR BLD AUTO: 10.6 % — SIGNIFICANT CHANGE UP (ref 2–14)
NEUTROPHILS # BLD AUTO: 4.37 K/UL — SIGNIFICANT CHANGE UP (ref 1.8–7.4)
NEUTROPHILS NFR BLD AUTO: 63.8 % — SIGNIFICANT CHANGE UP (ref 43–77)
NITRITE UR-MCNC: NEGATIVE — SIGNIFICANT CHANGE UP
NRBC # BLD: 0 /100 WBCS — SIGNIFICANT CHANGE UP (ref 0–0)
PH UR: 6 — SIGNIFICANT CHANGE UP (ref 5–8)
PLATELET # BLD AUTO: 223 K/UL — SIGNIFICANT CHANGE UP (ref 150–400)
POTASSIUM SERPL-MCNC: 3.8 MMOL/L — SIGNIFICANT CHANGE UP (ref 3.5–5.3)
POTASSIUM SERPL-SCNC: 3.8 MMOL/L — SIGNIFICANT CHANGE UP (ref 3.5–5.3)
PROT ?TM UR-MCNC: 20 MG/DL — HIGH (ref 0–12)
PROT SERPL-MCNC: 6.4 G/DL — SIGNIFICANT CHANGE UP (ref 6–8.3)
PROT UR-MCNC: NEGATIVE MG/DL — SIGNIFICANT CHANGE UP
PROT/CREAT UR-RTO: 0.4 RATIO — HIGH (ref 0–0.2)
PROTHROM AB SERPL-ACNC: 11.4 SEC — SIGNIFICANT CHANGE UP (ref 10.6–13.6)
RBC # BLD: 3.42 M/UL — LOW (ref 3.8–5.2)
RBC # FLD: 18 % — HIGH (ref 10.3–14.5)
RH IG SCN BLD-IMP: POSITIVE — SIGNIFICANT CHANGE UP
SARS-COV-2 RNA SPEC QL NAA+PROBE: SIGNIFICANT CHANGE UP
SODIUM SERPL-SCNC: 135 MMOL/L — SIGNIFICANT CHANGE UP (ref 135–145)
SP GR SPEC: 1.01 — SIGNIFICANT CHANGE UP (ref 1–1.03)
URATE SERPL-MCNC: 3 MG/DL — SIGNIFICANT CHANGE UP (ref 2.5–7)
UROBILINOGEN FLD QL: 0.2 E.U./DL — SIGNIFICANT CHANGE UP
WBC # BLD: 6.86 K/UL — SIGNIFICANT CHANGE UP (ref 3.8–10.5)
WBC # FLD AUTO: 6.86 K/UL — SIGNIFICANT CHANGE UP (ref 3.8–10.5)

## 2020-08-14 RX ORDER — ACETAMINOPHEN 500 MG
650 TABLET ORAL ONCE
Refills: 0 | Status: COMPLETED | OUTPATIENT
Start: 2020-08-14 | End: 2020-08-14

## 2020-08-14 RX ORDER — CITRIC ACID/SODIUM CITRATE 300-500 MG
15 SOLUTION, ORAL ORAL ONCE
Refills: 0 | Status: DISCONTINUED | OUTPATIENT
Start: 2020-08-14 | End: 2020-08-15

## 2020-08-14 RX ORDER — AMPICILLIN TRIHYDRATE 250 MG
2 CAPSULE ORAL ONCE
Refills: 0 | Status: COMPLETED | OUTPATIENT
Start: 2020-08-14 | End: 2020-08-14

## 2020-08-14 RX ORDER — OXYTOCIN 10 UNIT/ML
333.33 VIAL (ML) INJECTION
Qty: 20 | Refills: 0 | Status: DISCONTINUED | OUTPATIENT
Start: 2020-08-14 | End: 2020-08-15

## 2020-08-14 RX ORDER — SODIUM CHLORIDE 9 MG/ML
1000 INJECTION, SOLUTION INTRAVENOUS
Refills: 0 | Status: DISCONTINUED | OUTPATIENT
Start: 2020-08-14 | End: 2020-08-15

## 2020-08-14 RX ORDER — ACETAMINOPHEN 500 MG
1000 TABLET ORAL ONCE
Refills: 0 | Status: DISCONTINUED | OUTPATIENT
Start: 2020-08-14 | End: 2020-08-14

## 2020-08-14 RX ORDER — OXYTOCIN 10 UNIT/ML
2 VIAL (ML) INJECTION
Qty: 30 | Refills: 0 | Status: DISCONTINUED | OUTPATIENT
Start: 2020-08-14 | End: 2020-08-16

## 2020-08-14 RX ORDER — AMPICILLIN TRIHYDRATE 250 MG
1 CAPSULE ORAL EVERY 4 HOURS
Refills: 0 | Status: DISCONTINUED | OUTPATIENT
Start: 2020-08-14 | End: 2020-08-14

## 2020-08-14 RX ORDER — AMPICILLIN TRIHYDRATE 250 MG
1 CAPSULE ORAL EVERY 4 HOURS
Refills: 0 | Status: DISCONTINUED | OUTPATIENT
Start: 2020-08-14 | End: 2020-08-16

## 2020-08-14 RX ADMIN — Medication 108 GRAM(S): at 21:33

## 2020-08-14 RX ADMIN — Medication 2 MILLIUNIT(S)/MIN: at 20:19

## 2020-08-14 RX ADMIN — Medication 216 GRAM(S): at 17:25

## 2020-08-14 RX ADMIN — Medication 650 MILLIGRAM(S): at 15:57
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
## 2020-08-15 ENCOUNTER — RESULT REVIEW (OUTPATIENT)
Age: 25
End: 2020-08-15

## 2020-08-15 LAB
PCP SPEC-MCNC: SIGNIFICANT CHANGE UP
SARS-COV-2 IGG SERPL QL IA: NEGATIVE — SIGNIFICANT CHANGE UP
SARS-COV-2 IGM SERPL IA-ACNC: 0.59 INDEX — SIGNIFICANT CHANGE UP
T PALLIDUM AB TITR SER: NEGATIVE — SIGNIFICANT CHANGE UP

## 2020-08-15 PROCEDURE — 88307 TISSUE EXAM BY PATHOLOGIST: CPT | Mod: 26

## 2020-08-15 RX ORDER — OXYCODONE HYDROCHLORIDE 5 MG/1
5 TABLET ORAL ONCE
Refills: 0 | Status: DISCONTINUED | OUTPATIENT
Start: 2020-08-15 | End: 2020-08-16

## 2020-08-15 RX ORDER — IBUPROFEN 200 MG
600 TABLET ORAL EVERY 6 HOURS
Refills: 0 | Status: COMPLETED | OUTPATIENT
Start: 2020-08-15 | End: 2021-07-14

## 2020-08-15 RX ORDER — HYDROCORTISONE 1 %
1 OINTMENT (GRAM) TOPICAL EVERY 6 HOURS
Refills: 0 | Status: DISCONTINUED | OUTPATIENT
Start: 2020-08-15 | End: 2020-08-16

## 2020-08-15 RX ORDER — FENTANYL/BUPIVACAINE/NS/PF 2MCG/ML-.1
250 PLASTIC BAG, INJECTION (ML) INJECTION
Refills: 0 | Status: DISCONTINUED | OUTPATIENT
Start: 2020-08-15 | End: 2020-08-15

## 2020-08-15 RX ORDER — BENZOCAINE 10 %
1 GEL (GRAM) MUCOUS MEMBRANE EVERY 6 HOURS
Refills: 0 | Status: DISCONTINUED | OUTPATIENT
Start: 2020-08-15 | End: 2020-08-16

## 2020-08-15 RX ORDER — PRAMOXINE HYDROCHLORIDE 150 MG/15G
1 AEROSOL, FOAM RECTAL EVERY 4 HOURS
Refills: 0 | Status: DISCONTINUED | OUTPATIENT
Start: 2020-08-15 | End: 2020-08-16

## 2020-08-15 RX ORDER — KETOROLAC TROMETHAMINE 30 MG/ML
30 SYRINGE (ML) INJECTION ONCE
Refills: 0 | Status: DISCONTINUED | OUTPATIENT
Start: 2020-08-15 | End: 2020-08-15

## 2020-08-15 RX ORDER — DIBUCAINE 1 %
1 OINTMENT (GRAM) RECTAL EVERY 6 HOURS
Refills: 0 | Status: DISCONTINUED | OUTPATIENT
Start: 2020-08-15 | End: 2020-08-16

## 2020-08-15 RX ORDER — OXYTOCIN 10 UNIT/ML
333.33 VIAL (ML) INJECTION
Qty: 20 | Refills: 0 | Status: DISCONTINUED | OUTPATIENT
Start: 2020-08-15 | End: 2020-08-16

## 2020-08-15 RX ORDER — AER TRAVELER 0.5 G/1
1 SOLUTION RECTAL; TOPICAL EVERY 4 HOURS
Refills: 0 | Status: DISCONTINUED | OUTPATIENT
Start: 2020-08-15 | End: 2020-08-16

## 2020-08-15 RX ORDER — SODIUM CHLORIDE 9 MG/ML
3 INJECTION INTRAMUSCULAR; INTRAVENOUS; SUBCUTANEOUS EVERY 8 HOURS
Refills: 0 | Status: DISCONTINUED | OUTPATIENT
Start: 2020-08-15 | End: 2020-08-16

## 2020-08-15 RX ORDER — MAGNESIUM HYDROXIDE 400 MG/1
30 TABLET, CHEWABLE ORAL
Refills: 0 | Status: DISCONTINUED | OUTPATIENT
Start: 2020-08-15 | End: 2020-08-16

## 2020-08-15 RX ORDER — LANOLIN
1 OINTMENT (GRAM) TOPICAL EVERY 6 HOURS
Refills: 0 | Status: DISCONTINUED | OUTPATIENT
Start: 2020-08-15 | End: 2020-08-16

## 2020-08-15 RX ORDER — IBUPROFEN 200 MG
600 TABLET ORAL EVERY 6 HOURS
Refills: 0 | Status: DISCONTINUED | OUTPATIENT
Start: 2020-08-15 | End: 2020-08-16

## 2020-08-15 RX ORDER — DIPHENHYDRAMINE HCL 50 MG
25 CAPSULE ORAL EVERY 6 HOURS
Refills: 0 | Status: DISCONTINUED | OUTPATIENT
Start: 2020-08-15 | End: 2020-08-16

## 2020-08-15 RX ORDER — ACETAMINOPHEN 500 MG
975 TABLET ORAL
Refills: 0 | Status: DISCONTINUED | OUTPATIENT
Start: 2020-08-15 | End: 2020-08-16

## 2020-08-15 RX ORDER — OXYCODONE HYDROCHLORIDE 5 MG/1
5 TABLET ORAL
Refills: 0 | Status: DISCONTINUED | OUTPATIENT
Start: 2020-08-15 | End: 2020-08-16

## 2020-08-15 RX ORDER — TETANUS TOXOID, REDUCED DIPHTHERIA TOXOID AND ACELLULAR PERTUSSIS VACCINE, ADSORBED 5; 2.5; 8; 8; 2.5 [IU]/.5ML; [IU]/.5ML; UG/.5ML; UG/.5ML; UG/.5ML
0.5 SUSPENSION INTRAMUSCULAR ONCE
Refills: 0 | Status: COMPLETED | OUTPATIENT
Start: 2020-08-15

## 2020-08-15 RX ORDER — SIMETHICONE 80 MG/1
80 TABLET, CHEWABLE ORAL EVERY 4 HOURS
Refills: 0 | Status: DISCONTINUED | OUTPATIENT
Start: 2020-08-15 | End: 2020-08-16

## 2020-08-15 RX ADMIN — Medication 1000 MILLIUNIT(S)/MIN: at 04:50

## 2020-08-15 RX ADMIN — Medication 975 MILLIGRAM(S): at 10:30

## 2020-08-15 RX ADMIN — Medication 600 MILLIGRAM(S): at 19:11

## 2020-08-15 RX ADMIN — Medication 600 MILLIGRAM(S): at 18:19

## 2020-08-15 RX ADMIN — Medication 975 MILLIGRAM(S): at 16:30

## 2020-08-15 RX ADMIN — Medication 975 MILLIGRAM(S): at 09:43

## 2020-08-15 RX ADMIN — Medication 30 MILLIGRAM(S): at 07:28

## 2020-08-15 RX ADMIN — SODIUM CHLORIDE 3 MILLILITER(S): 9 INJECTION INTRAMUSCULAR; INTRAVENOUS; SUBCUTANEOUS at 14:12

## 2020-08-15 RX ADMIN — Medication 975 MILLIGRAM(S): at 15:30

## 2020-08-15 RX ADMIN — Medication 600 MILLIGRAM(S): at 23:53

## 2020-08-15 RX ADMIN — SODIUM CHLORIDE 3 MILLILITER(S): 9 INJECTION INTRAMUSCULAR; INTRAVENOUS; SUBCUTANEOUS at 07:21

## 2020-08-15 RX ADMIN — Medication 108 GRAM(S): at 01:55

## 2020-08-15 RX ADMIN — SODIUM CHLORIDE 3 MILLILITER(S): 9 INJECTION INTRAMUSCULAR; INTRAVENOUS; SUBCUTANEOUS at 22:21

## 2020-08-15 RX ADMIN — Medication 250 MILLILITER(S): at 01:30

## 2020-08-15 RX ADMIN — Medication 600 MILLIGRAM(S): at 13:30

## 2020-08-15 RX ADMIN — Medication 975 MILLIGRAM(S): at 22:00

## 2020-08-15 RX ADMIN — Medication 600 MILLIGRAM(S): at 12:46

## 2020-08-15 RX ADMIN — Medication 30 MILLIGRAM(S): at 05:30

## 2020-08-15 RX ADMIN — Medication 975 MILLIGRAM(S): at 21:18

## 2020-08-15 RX ADMIN — Medication 1 TABLET(S): at 12:45

## 2020-08-15 NOTE — LACTATION INITIAL EVALUATION - NIPPLE ASSESSMENT (LEFT)
medium/normal Interpolation Flap Text: A decision was made to reconstruct the defect utilizing an interpolation axial flap and a staged reconstruction.  A telfa template was made of the defect.  This telfa template was then used to outline the interpolation flap.  The donor area for the pedicle flap was then injected with anesthesia.  The flap was excised through the skin and subcutaneous tissue down to the layer of the underlying musculature.  The interpolation flap was carefully excised within this deep plane to maintain its blood supply.  The edges of the donor site were undermined.   The donor site was closed in a primary fashion.  The pedicle was then rotated into position and sutured.  Once the tube was sutured into place, adequate blood supply was confirmed with blanching and refill.  The pedicle was then wrapped with xeroform gauze and dressed appropriately with a telfa and gauze bandage to ensure continued blood supply and protect the attached pedicle.

## 2020-08-15 NOTE — LACTATION INITIAL EVALUATION - NS LACT CON REASON FOR REQ
11hr old , 2 stools/2 voids (breast only). mother lives in shelter and has h/o bipolar disorder. uses marijuana medicinally, SW consult done. ACS will not take case as marijuana use is medicinal and mom has medical ID card. urine + for marijuana. baby currently bagged for urine screen. mom wishes to bf exclusively. reported to SW that she will abstain from marijuana while breastfeeding. baby sleepy, s2s with mom. unable to elicit feeding cues at this time. mom taught hand expression and finger feeding with return demo. advised to express colostrum and finger feed to  for approx 10-15min q2-3h while baby is sleepy. made aware of LC availability should latch assistance be necessary. reviewed bfing basics, feeding cues, and encouraged s2s./primaparous mom 11hr old , 2 stools/2 voids (breast only). mother lives in shelter and has h/o bipolar disorder. no meds at present but has therapist and psychiatrist treating her. uses marijuana medicinally, SW consult done. ACS will not take case as marijuana use is medicinal and mom has medical ID card. maternal urine + for marijuana. baby currently bagged for urine screen. mom wishes to bf exclusively. reported to SW that she will abstain from marijuana while breastfeeding. pediatrician aware of maternal marijuana history and aware mom is breastfeeding. baby sleepy, s2s with mom. unable to elicit feeding cues at this time. mom taught hand expression and finger feeding with return demo. advised to express colostrum and finger feed to  for approx 10-15min q2-3h while baby is sleepy. made aware of LC availability should latch assistance be necessary. reviewed bfing basics, feeding cues, and encouraged s2s./primaparous mom

## 2020-08-16 ENCOUNTER — TRANSCRIPTION ENCOUNTER (OUTPATIENT)
Age: 25
End: 2020-08-16

## 2020-08-16 VITALS
TEMPERATURE: 98 F | OXYGEN SATURATION: 99 % | HEART RATE: 76 BPM | DIASTOLIC BLOOD PRESSURE: 51 MMHG | SYSTOLIC BLOOD PRESSURE: 87 MMHG | RESPIRATION RATE: 18 BRPM

## 2020-08-16 RX ORDER — TETANUS TOXOID, REDUCED DIPHTHERIA TOXOID AND ACELLULAR PERTUSSIS VACCINE, ADSORBED 5; 2.5; 8; 8; 2.5 [IU]/.5ML; [IU]/.5ML; UG/.5ML; UG/.5ML; UG/.5ML
0.5 SUSPENSION INTRAMUSCULAR ONCE
Refills: 0 | Status: COMPLETED | OUTPATIENT
Start: 2020-08-16 | End: 2020-08-16

## 2020-08-16 RX ORDER — IBUPROFEN 200 MG
1 TABLET ORAL
Qty: 0 | Refills: 0 | DISCHARGE
Start: 2020-08-16

## 2020-08-16 RX ADMIN — Medication 600 MILLIGRAM(S): at 05:49

## 2020-08-16 RX ADMIN — Medication 600 MILLIGRAM(S): at 13:20

## 2020-08-16 RX ADMIN — TETANUS TOXOID, REDUCED DIPHTHERIA TOXOID AND ACELLULAR PERTUSSIS VACCINE, ADSORBED 0.5 MILLILITER(S): 5; 2.5; 8; 8; 2.5 SUSPENSION INTRAMUSCULAR at 06:26

## 2020-08-16 RX ADMIN — Medication 600 MILLIGRAM(S): at 12:27

## 2020-08-16 RX ADMIN — Medication 1 TABLET(S): at 12:27

## 2020-08-16 RX ADMIN — Medication 600 MILLIGRAM(S): at 00:47

## 2020-08-16 RX ADMIN — Medication 600 MILLIGRAM(S): at 06:54

## 2020-08-16 NOTE — DISCHARGE NOTE OB - CARE PLAN
Principal Discharge DX:	Postpartum state  Goal:	happy and healthy mom and baby! Principal Discharge DX:	Postpartum state  Goal:	happy and healthy mom and baby!  Assessment and plan of treatment:	Please follow-up with your OB doctor within 4-6 weeks. You can resume a regular diet at home and you should continue your prenatal vitamins as directed. You can take Motrin 600mg by mouth every 6 hours for pain as needed.    Please place nothing in the vagina for 6 weeks (no tampons, no sex, no douching, no baths, no hot tubs, no swimming pools, etc). If you have severe headaches and/or vision changes, heavy bleeding, chest pain, or shortness of breath, please call your provider or go to the nearest ED. Call your OB with any signs of symptoms of infection including fever > 100.4 degrees, severe pain, malodorous vaginal discharge or heavy bleeding requiring more than 1-2 pads/hour.

## 2020-08-16 NOTE — DISCHARGE NOTE OB - MEDICATION SUMMARY - MEDICATIONS TO STOP TAKING
I will STOP taking the medications listed below when I get home from the hospital:    Zofran 4 mg oral tablet  -- 1 tab(s) by mouth every 8 hours    acetaminophen 325 mg oral tablet  -- 3 tab(s) by mouth every 6 hours, As needed, Mild Pain (1 - 3)    polyethylene glycol 3350 oral powder for reconstitution  -- 17 gram(s) by mouth once a day

## 2020-08-16 NOTE — DISCHARGE NOTE OB - MEDICATION SUMMARY - MEDICATIONS TO TAKE
I will START or STAY ON the medications listed below when I get home from the hospital:    acetaminophen 325 mg oral tablet  -- 3 tab(s) by mouth every 6 hours, As needed, Mild Pain (1 - 3)  -- Indication: For Pain    ibuprofen 600 mg oral tablet  -- 1 tab(s) by mouth every 6 hours  -- Indication: For Pain

## 2020-08-16 NOTE — DISCHARGE NOTE OB - PATIENT PORTAL LINK FT
You can access the FollowMyHealth Patient Portal offered by [facility] by registering at the following website: [email or web address]. By joining [employer]’s FollowMyHealth portal, you will also be able to view your health information using other applications (apps) compatible with our system.

## 2020-08-16 NOTE — DISCHARGE NOTE OB - HOSPITAL COURSE
Pt is a 25yF s/p .  Please see delivery note for details.  During postpartum course patient's vitals were stable, vaginal bleeding appropriate, and pain well controlled.  On day of discharge patient was ambulating, pt had adequate oral intake, and was voiding freely.  Discharge instructions and precautions were given.  Will return to clinic in 4 weeks for postpartum visit.

## 2020-08-16 NOTE — PROGRESS NOTE ADULT - SUBJECTIVE AND OBJECTIVE BOX
Patient evaluated at bedside.      She reports pain is well controlled with medications.     She has been ambulating without assistance, voiding spontaneously, tolerating solid food and PO fluids, and is breastfeeding.     She denies HA, dizziness, chest pain, palpitations, shortness of breath, n/v, heavy vaginal bleeding or perineal discomfort.    Physical Exam:  Vital Signs Last 24 Hrs  T(C): 36.4 (16 Aug 2020 06:00), Max: 37 (15 Aug 2020 18:03)  T(F): 97.6 (16 Aug 2020 06:00), Max: 98.6 (15 Aug 2020 18:03)  HR: 60 (16 Aug 2020 06:00) (60 - 89)  BP: 104/66 (16 Aug 2020 06:00) (87/50 - 104/66)  RR: 18 (16 Aug 2020 06:00) (17 - 18)  SpO2: 99% (16 Aug 2020 06:00) (98% - 100%)    GA: NAD  Abd: + BS, soft, nontender, nondistended, no rebound or guarding, uterus firm at 1 fingerbreadth below the umbilicus with right lateral deviation  : lochia WNL  Extremities: no calf tenderness                          9.4    6.86  )-----------( 223      ( 14 Aug 2020 15:45 )             30.0     08-14    135  |  103  |  5<L>  ----------------------------<  81  3.8   |  21<L>  |  0.40<L>    Ca    8.9      14 Aug 2020 15:45    TPro  6.4  /  Alb  3.5  /  TBili  <0.2  /  DBili  x   /  AST  16  /  ALT  14  /  AlkPhos  358<H>  08-14    PT/INR - ( 14 Aug 2020 15:45 )   PT: 11.4 sec;   INR: 0.95          PTT - ( 14 Aug 2020 15:45 )  PTT:30.3 sec

## 2020-08-16 NOTE — DISCHARGE NOTE OB - CARE PROVIDER_API CALL
Kinsey Stoner)  Obstetrics and Gynecology  [address] [place] [address]  Phone: [phone]  Fax: ()-  Follow Up Time:

## 2020-08-16 NOTE — PROGRESS NOTE ADULT - ATTENDING COMMENTS
Will consider social work consult prior to delivery   Plan for discharge pending adequate social support  Plan for routine followup for outpatient care

## 2020-08-16 NOTE — PROGRESS NOTE ADULT - ASSESSMENT
A/P yo 25y  s/p , PP# 1, stable  1. Pain: OPM  2. GI: Reg  3. :  Voiding  4. DVT prophylaxis: SCDs, ambulation  5. Dispo: PP#1 unless otherwise specified

## 2020-08-16 NOTE — DISCHARGE NOTE OB - PLAN OF CARE
happy and healthy mom and baby! Please follow-up with your OB doctor within 4-6 weeks. You can resume a regular diet at home and you should continue your prenatal vitamins as directed. You can take Motrin 600mg by mouth every 6 hours for pain as needed.    Please place nothing in the vagina for 6 weeks (no tampons, no sex, no douching, no baths, no hot tubs, no swimming pools, etc). If you have severe headaches and/or vision changes, heavy bleeding, chest pain, or shortness of breath, please call your provider or go to the nearest ED. Call your OB with any signs of symptoms of infection including fever > 100.4 degrees, severe pain, malodorous vaginal discharge or heavy bleeding requiring more than 1-2 pads/hour.

## 2020-08-17 LAB
CULTURE RESULTS: NO GROWTH — SIGNIFICANT CHANGE UP
SPECIMEN SOURCE: SIGNIFICANT CHANGE UP

## 2020-08-19 ENCOUNTER — APPOINTMENT (OUTPATIENT)
Dept: ANTEPARTUM | Facility: CLINIC | Age: 25
End: 2020-08-19

## 2020-08-19 DIAGNOSIS — Z86.19 PERSONAL HISTORY OF OTHER INFECTIOUS AND PARASITIC DISEASES: ICD-10-CM

## 2020-08-19 DIAGNOSIS — F41.9 ANXIETY DISORDER, UNSPECIFIED: ICD-10-CM

## 2020-08-19 DIAGNOSIS — Z23 ENCOUNTER FOR IMMUNIZATION: ICD-10-CM

## 2020-08-19 DIAGNOSIS — A60.9 ANOGENITAL HERPESVIRAL INFECTION, UNSPECIFIED: ICD-10-CM

## 2020-08-19 DIAGNOSIS — F12.90 CANNABIS USE, UNSPECIFIED, UNCOMPLICATED: ICD-10-CM

## 2020-08-19 DIAGNOSIS — N85.8 OTHER SPECIFIED NONINFLAMMATORY DISORDERS OF UTERUS: ICD-10-CM

## 2020-08-19 DIAGNOSIS — Z3A.38 38 WEEKS GESTATION OF PREGNANCY: ICD-10-CM

## 2020-08-19 DIAGNOSIS — O23.43 UNSPECIFIED INFECTION OF URINARY TRACT IN PREGNANCY, THIRD TRIMESTER: ICD-10-CM

## 2020-08-19 DIAGNOSIS — F43.10 POST-TRAUMATIC STRESS DISORDER, UNSPECIFIED: ICD-10-CM

## 2020-08-19 DIAGNOSIS — O36.8130 DECREASED FETAL MOVEMENTS, THIRD TRIMESTER, NOT APPLICABLE OR UNSPECIFIED: ICD-10-CM

## 2020-08-19 DIAGNOSIS — B96.20 UNSPECIFIED ESCHERICHIA COLI [E. COLI] AS THE CAUSE OF DISEASES CLASSIFIED ELSEWHERE: ICD-10-CM

## 2020-08-19 DIAGNOSIS — F31.9 BIPOLAR DISORDER, UNSPECIFIED: ICD-10-CM

## 2020-08-19 DIAGNOSIS — O99.89 OTHER SPECIFIED DISEASES AND CONDITIONS COMPLICATING PREGNANCY, CHILDBIRTH AND THE PUERPERIUM: ICD-10-CM

## 2020-08-20 LAB — SURGICAL PATHOLOGY STUDY: SIGNIFICANT CHANGE UP

## 2021-03-30 NOTE — ED PROVIDER NOTE - MEDICAL DECISION MAKING DETAILS
Triage Note: Per mom pt. Started with a runny nose and cough today. Pt. Vomited x 3 since 5:30 pm. Mom denies diarrhea or fever. No Meds PTA. Patient with hyperemesis gravidarum feeling better , tolerating PO. Labs and sonogram wnl. + IUP , +FH with small complex cyst. Patient aware and gyn discussed with patient. Will tx with reglan and abx for + UA as per gyn. REcommend f/u with Dr. Jarquin.

## 2022-02-25 NOTE — DISCHARGE NOTE ANTEPARTUM - FLU SEASON?
Transitional planning:  Received vm from Lucio Richard, at [facility] in [facility], states suspending admissions at this time, call back: [phone]. No

## 2022-05-18 RX ORDER — METHENAMINE MANDELATE 1 G
1 TABLET ORAL
Qty: 0 | Refills: 0 | DISCHARGE

## 2023-10-27 ENCOUNTER — EMERGENCY (EMERGENCY)
Facility: HOSPITAL | Age: 28
LOS: 1 days | Discharge: HOME | End: 2023-10-27
Attending: EMERGENCY MEDICINE | Admitting: EMERGENCY MEDICINE
Payer: COMMERCIAL

## 2023-10-27 VITALS
TEMPERATURE: 99 F | HEART RATE: 67 BPM | OXYGEN SATURATION: 99 % | SYSTOLIC BLOOD PRESSURE: 108 MMHG | DIASTOLIC BLOOD PRESSURE: 66 MMHG | RESPIRATION RATE: 18 BRPM

## 2023-10-27 VITALS
SYSTOLIC BLOOD PRESSURE: 112 MMHG | HEIGHT: 62 IN | RESPIRATION RATE: 20 BRPM | TEMPERATURE: 98 F | HEART RATE: 58 BPM | DIASTOLIC BLOOD PRESSURE: 71 MMHG | OXYGEN SATURATION: 100 % | WEIGHT: 85.1 LBS

## 2023-10-27 DIAGNOSIS — Z88.1 ALLERGY STATUS TO OTHER ANTIBIOTIC AGENTS STATUS: ICD-10-CM

## 2023-10-27 DIAGNOSIS — Z86.2 PERSONAL HISTORY OF DISEASES OF THE BLOOD AND BLOOD-FORMING ORGANS AND CERTAIN DISORDERS INVOLVING THE IMMUNE MECHANISM: ICD-10-CM

## 2023-10-27 DIAGNOSIS — H53.71 GLARE SENSITIVITY: ICD-10-CM

## 2023-10-27 DIAGNOSIS — R11.0 NAUSEA: ICD-10-CM

## 2023-10-27 DIAGNOSIS — R51.9 HEADACHE, UNSPECIFIED: ICD-10-CM

## 2023-10-27 DIAGNOSIS — Z86.19 PERSONAL HISTORY OF OTHER INFECTIOUS AND PARASITIC DISEASES: ICD-10-CM

## 2023-10-27 DIAGNOSIS — Z86.59 PERSONAL HISTORY OF OTHER MENTAL AND BEHAVIORAL DISORDERS: ICD-10-CM

## 2023-10-27 DIAGNOSIS — F31.9 BIPOLAR DISORDER, UNSPECIFIED: ICD-10-CM

## 2023-10-27 PROBLEM — N76.0 ACUTE VAGINITIS: Chronic | Status: ACTIVE | Noted: 2018-07-08

## 2023-10-27 PROBLEM — F31.10 BIPOLAR DISORDER, CURRENT EPISODE MANIC WITHOUT PSYCHOTIC FEATURES, UNSPECIFIED: Chronic | Status: ACTIVE | Noted: 2017-06-29

## 2023-10-27 PROBLEM — A60.00 HERPESVIRAL INFECTION OF UROGENITAL SYSTEM, UNSPECIFIED: Chronic | Status: ACTIVE | Noted: 2018-07-08

## 2023-10-27 PROBLEM — F41.9 ANXIETY DISORDER, UNSPECIFIED: Chronic | Status: ACTIVE | Noted: 2017-06-29

## 2023-10-27 PROCEDURE — G1004: CPT

## 2023-10-27 PROCEDURE — 70450 CT HEAD/BRAIN W/O DYE: CPT | Mod: 26,MG

## 2023-10-27 PROCEDURE — 99284 EMERGENCY DEPT VISIT MOD MDM: CPT | Mod: 25

## 2023-10-27 PROCEDURE — 70450 CT HEAD/BRAIN W/O DYE: CPT | Mod: MG

## 2023-10-27 PROCEDURE — 99284 EMERGENCY DEPT VISIT MOD MDM: CPT

## 2023-10-27 NOTE — ED PROVIDER NOTE - OBJECTIVE STATEMENT
27 y/o f hx anemia presents c/o intermittent headache for the past several weeks.  Pt stating she will get a headache that sometimes lasts days, has associated sensitivity to light and nausea.  Pt stating she saw her hematologist last week and was given a prescription for a CT head which she hasn't gotten yet, stating her headache resolved this morning but decided to come to ED to get the CT.  Denies neck pain, dizziness, all other ROS negative.

## 2023-10-27 NOTE — ED PROVIDER NOTE - ATTENDING APP SHARED VISIT CONTRIBUTION OF CARE
Attending Statement: I have personally performed a face to face diagnostic evaluation on this patient. I have reviewed the ACP note and agree with the history, exam and plan of care, except as noted.     Attending Contribution to Care:  28F with hx anemia presents c/o intermittent headache for the past several weeks, asymptomatic today in ED, requesting CT head.  Neuro exam intact, CT head neg, will d/c, recommend outpatient f/u with neuro, Pt feeling improved and is stable for DC. ED evaluation and management discussed with the patient in detail.  Close PMD follow up encouraged.  Strict ED return instructions discussed in detail and patient given the opportunity to ask any questions about their discharge diagnosis and instructions. Patient verbalized understanding.

## 2023-10-27 NOTE — ED PROVIDER NOTE - CLINICAL SUMMARY MEDICAL DECISION MAKING FREE TEXT BOX
27 y/o f hx anemia presents c/o intermittent headache for the past several weeks, asymptomatic today in ED, requesting CT head.  Neuro exam intact, CT head neg, will d/c, recommend outpatient f/u with neuro, advised to return to ED if having severe headache not resolving with tylenol/ibuprofen

## 2023-10-27 NOTE — ED ADULT NURSE NOTE - NSFALLUNIVINTERV_ED_ALL_ED
Bed/Stretcher in lowest position, wheels locked, appropriate side rails in place/Call bell, personal items and telephone in reach/Instruct patient to call for assistance before getting out of bed/chair/stretcher/Non-slip footwear applied when patient is off stretcher/[place] to call system/Physically safe environment - no spills, clutter or unnecessary equipment/Purposeful proactive rounding/Room/bathroom lighting operational, light cord in reach

## 2023-10-27 NOTE — ED PROVIDER NOTE - NSFOLLOWUPINSTRUCTIONS_ED_ALL_ED_FT
Please take 600mg ibuprofen as needed for headache and/or tylenol 650mg or 975mg.  Please do not exceed 2400mg of ibuprofen in 24 hours or 3000mg of tylenol in 24 hours.    General Headache Without Cause  A headache is pain or discomfort felt around the head or neck area. There are many causes and types of headaches. A few common types include:  Tension headaches.  Migraine headaches.  Cluster headaches.  Chronic daily headaches.  Sometimes, the specific cause of a headache may not be found.    Follow these instructions at home:  Watch your condition for any changes. Let your health care provider know about them. Take these steps to help with your condition:    Managing pain    A bag of ice on a towel on the skin.   A heating pad for use on the painful area.   Take over-the-counter and prescription medicines only as told by your health care provider. Treatment may include medicines for pain that are taken by mouth or applied to the skin.  Lie down in a dark, quiet room when you have a headache.  Keep lights dim if bright lights bother you or make your headaches worse.  If directed, put ice on your head and neck area:  Put ice in a plastic bag.  Place a towel between your skin and the bag.  Leave the ice on for 20 minutes, 2–3 times per day.  Remove the ice if your skin turns bright red. This is very important. If you cannot feel pain, heat, or cold, you have a greater risk of damage to the area.  If directed, apply heat to the affected area. Use the heat source that your health care provider recommends, such as a moist heat pack or a heating pad.  Place a towel between your skin and the heat source.  Leave the heat on for 20–30 minutes.  Remove the heat if your skin turns bright red. This is especially important if you are unable to feel pain, heat, or cold. You have a greater risk of getting burned.  Eating and drinking    Eat meals on a regular schedule.  If you drink alcohol:  Limit how much you have to:  0–1 drink a day for women who are not pregnant.  0–2 drinks a day for men.  Know how much alcohol is in a drink. In the [place] one drink equals one 12 oz bottle of beer (355 mL), one 5 oz glass of wine (148 mL), or one 1½ oz glass of hard liquor (44 mL).  Stop drinking caffeine, or decrease the amount of caffeine you drink.  Drink enough fluid to keep your urine pale yellow.  General instructions    A person writing in a journal.   Keep a headache journal to help find out what may trigger your headaches. For example, write down:  What you eat and drink.  How much sleep you get.  Any change to your diet or medicines.  Try massage or other relaxation techniques.  Limit stress.  Sit up straight, and do not tense your muscles.  Do not use any products that contain nicotine or tobacco. These products include cigarettes, chewing tobacco, and vaping devices, such as e-cigarettes. If you need help quitting, ask your health care provider.  Exercise regularly as told by your health care provider.  Sleep on a regular schedule. Get 7–9 hours of sleep each night, or the amount recommended by your health care provider.  Keep all follow-up visits. This is important.  Contact a health care provider if:  Medicine does not help your symptoms.  You have a headache that is different from your usual headache.  You have nausea or you vomit.  You have a fever.  Get help right away if:  Your headache:  Becomes severe quickly.  Gets worse after moderate to intense physical activity.  You have any of these symptoms:  Repeated vomiting.  Pain or stiffness in your neck.  Changes to your vision.  Pain in an eye or ear.  Problems with speech.  Muscular weakness or loss of muscle control.  Loss of balance or coordination.  You feel faint or pass out.  You have confusion.  You have a seizure.  These symptoms may represent a serious problem that is an emergency. Do not wait to see if the symptoms will go away. Get medical help right away. Call your local emergency services (911 in the [place] Do not drive yourself to the hospital.    Summary  A headache is pain or discomfort felt around the head or neck area.  There are many causes and types of headaches. In some cases, the cause may not be found.  Keep a headache journal to help find out what may trigger your headaches. Watch your condition for any changes. Let your health care provider know about them.  Contact a health care provider if you have a headache that is different from the usual headache, or if your symptoms are not helped by medicine.  Get help right away if your headache becomes severe, you vomit, you have a loss of vision, you lose your balance, or you have a seizure.  This information is not intended to replace advice given to you by your health care provider. Make sure you discuss any questions you have with your health care provider.

## 2023-10-27 NOTE — ED PROVIDER NOTE - PATIENT PORTAL LINK FT
You can access the FollowMyHealth Patient Portal offered by [facility] by registering at the following website: [email or web address]. By joining [facility]’s FollowMyHealth portal, you will also be able to view your health information using other applications (apps) compatible with our system.

## 2023-10-27 NOTE — ED PROVIDER NOTE - NS ED ATTENDING STATEMENT MOD
This was a shared visit with the VIVEK. I reviewed and verified the documentation and independently performed the documented:

## 2023-10-27 NOTE — ED ADULT TRIAGE NOTE - PATIENT ON (OXYGEN DELIVERY METHOD)
Quality 110: Preventive Care And Screening: Influenza Immunization: Influenza Immunization Administered during Influenza season Detail Level: Detailed room air

## 2023-10-27 NOTE — ED ADULT NURSE NOTE - NSICDXPASTMEDICALHX_GEN_ALL_CORE_FT
PAST MEDICAL HISTORY:  Anxiety     Bacterial vaginosis     Genital herpes     Manic bipolar I disorder

## 2023-10-27 NOTE — ED ADULT NURSE NOTE - OBJECTIVE STATEMENT
Presents for c/o headaches and fatigue x 2 weeks, advised to present for ED for CT head. Denies trauma.     On assessment- AOx4, breathing even and unlabored on RA, no apparent distress, VSS in triage, able to speak in clear coherent sentences, steady gait unassisted, neuro intact with no apparent facial asymmetry, PERRLA.

## 2023-10-27 NOTE — ED ADULT TRIAGE NOTE - CHIEF COMPLAINT QUOTE
Pt presents to ED c/o migraines and fatigue over 2 weeks was seen by MD and was advised to get CT's. Pt A&Ox4, conversive in full sentences and ambulates. Pt had Fe infusion last week. Denies cp, sob, fever, chills, n/v/d.